# Patient Record
Sex: MALE | Race: WHITE | NOT HISPANIC OR LATINO | Employment: FULL TIME | URBAN - METROPOLITAN AREA
[De-identification: names, ages, dates, MRNs, and addresses within clinical notes are randomized per-mention and may not be internally consistent; named-entity substitution may affect disease eponyms.]

---

## 2017-02-16 ENCOUNTER — OFFICE VISIT (OUTPATIENT)
Dept: FAMILY MEDICINE CLINIC | Facility: CLINIC | Age: 47
End: 2017-02-16

## 2017-02-16 VITALS
WEIGHT: 283 LBS | HEIGHT: 72 IN | BODY MASS INDEX: 38.33 KG/M2 | SYSTOLIC BLOOD PRESSURE: 130 MMHG | OXYGEN SATURATION: 97 % | TEMPERATURE: 98 F | HEART RATE: 76 BPM | RESPIRATION RATE: 20 BRPM | DIASTOLIC BLOOD PRESSURE: 70 MMHG

## 2017-02-16 DIAGNOSIS — M25.569 CHRONIC KNEE PAIN, UNSPECIFIED LATERALITY: Primary | ICD-10-CM

## 2017-02-16 DIAGNOSIS — G62.9 NEUROPATHY: ICD-10-CM

## 2017-02-16 DIAGNOSIS — F41.9 ANXIETY: ICD-10-CM

## 2017-02-16 DIAGNOSIS — K21.9 GASTROESOPHAGEAL REFLUX DISEASE WITHOUT ESOPHAGITIS: ICD-10-CM

## 2017-02-16 DIAGNOSIS — G89.29 CHRONIC KNEE PAIN, UNSPECIFIED LATERALITY: Primary | ICD-10-CM

## 2017-02-16 PROCEDURE — 99213 OFFICE O/P EST LOW 20 MIN: CPT | Performed by: FAMILY MEDICINE

## 2017-02-16 RX ORDER — HYDROCODONE BITARTRATE AND ACETAMINOPHEN 7.5; 325 MG/1; MG/1
1 TABLET ORAL DAILY PRN
Qty: 30 TABLET | Refills: 0 | Status: SHIPPED | OUTPATIENT
Start: 2017-02-16 | End: 2017-03-14 | Stop reason: SDUPTHER

## 2017-02-16 NOTE — PATIENT INSTRUCTIONS
This is a very nice 46-year-old who struggles with chronic knee pain and neuropathy and is here for routine follow-up.  I will notify him when his urine drug test is back.  I would like him to call if there is a problem.

## 2017-02-16 NOTE — PROGRESS NOTES
Subjective   Al Peña is a 46 y.o. 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  He tells me he is very distraught because his  tried to commit suicide after it was found that he had in best old $500,000 from his employer over 3 years.  This was to feed his sex and gambling addiction's.  His  has survived and is home now and his 's mother and father have come up from [place] to help with his intensive treatment, but this has obviously caused tremendous stress.       The following portions of the patient's history were reviewed and updated as appropriate: current medications, past family history, past medical history, past social history, past surgical history and problem list.    Review of Systems   Musculoskeletal: Positive for arthralgias and back pain.   Psychiatric/Behavioral: Positive for sleep disturbance. The patient is nervous/anxious.    All other systems reviewed and are negative.      Objective   Physical Exam   Constitutional: He is oriented to person, place, and time. He appears well-developed and well-nourished.   HENT:   Head: Normocephalic and atraumatic.   Eyes: EOM are normal. Pupils are equal, round, and reactive to light.   Neck: Normal range of motion. Neck supple.   Cardiovascular: Normal rate and regular rhythm.    Pulmonary/Chest: Effort normal.   Musculoskeletal: Normal range of motion. He exhibits tenderness. He exhibits no edema.   Neurological: He is alert and oriented to person, place, and time.   Skin: Skin is warm and dry.   Psychiatric: His speech is normal and behavior is normal. Judgment and thought content normal. His mood appears anxious. His affect is not angry, not blunt, not  labile and not inappropriate. He is not actively hallucinating. Cognition and memory are normal. He exhibits a depressed mood. He is attentive.   Nursing note and vitals reviewed.      Assessment/Plan   Al was seen today for med refill and labs only.    Diagnoses and all orders for this visit:    Chronic knee pain, unspecified laterality  -     [id number]+Oxycodone+Crt-Unbund    Neuropathy    Gastroesophageal reflux disease without esophagitis    Anxiety    Other orders  -     HYDROcodone-acetaminophen (NORCO) 7.5-325 MG per tablet; Take 1 tablet by mouth Daily As Needed for moderate pain (4-6).                   I would like him to return for another visit in 6 month(s)

## 2017-02-21 LAB
ALPRAZ UR CFM-MCNC: 346 NG/ML
ALPRAZ UR QL: POSITIVE
AMPHETAMINES UR QL SCN: NEGATIVE NG/ML
BARBITURATES UR QL SCN: NEGATIVE NG/ML
BENZODIAZ UR QL SCN: NORMAL NG/ML
BENZODIAZ UR QL: POSITIVE NG/ML
BZE UR QL SCN: NEGATIVE NG/ML
CANNABINOIDS UR QL SCN: NEGATIVE NG/ML
CLONAZEPAM UR QL: NEGATIVE
CREAT UR-MCNC: 123.6 MG/DL (ref 20–300)
FLURAZEPAM UR QL: NEGATIVE
LORAZEPAM UR QL: NEGATIVE
Lab: NORMAL
METHADONE UR QL SCN: NEGATIVE NG/ML
MIDAZOLAM UR QL CFM: NEGATIVE
NORDIAZEPAM UR QL: NEGATIVE
OPIATES UR QL SCN: NEGATIVE NG/ML
OXAZEPAM UR QL: NEGATIVE
OXYCODONE+OXYMORPHONE UR QL SCN: NEGATIVE NG/ML
PCP UR QL: NEGATIVE NG/ML
PH UR: 6.2 [PH] (ref 4.5–8.9)
PROPOXYPH UR QL SCN: NEGATIVE NG/ML
TEMAZEPAM UR QL CFM: NEGATIVE
TRIAZOLAM UR QL: NEGATIVE

## 2017-02-24 ENCOUNTER — OFFICE VISIT (OUTPATIENT)
Dept: FAMILY MEDICINE CLINIC | Facility: CLINIC | Age: 47
End: 2017-02-24

## 2017-02-24 VITALS
OXYGEN SATURATION: 98 % | DIASTOLIC BLOOD PRESSURE: 78 MMHG | WEIGHT: 283 LBS | BODY MASS INDEX: 38.33 KG/M2 | HEART RATE: 78 BPM | TEMPERATURE: 98.6 F | SYSTOLIC BLOOD PRESSURE: 132 MMHG | HEIGHT: 72 IN | RESPIRATION RATE: 20 BRPM

## 2017-02-24 DIAGNOSIS — L98.9 SKIN LESION OF LEFT ARM: Primary | ICD-10-CM

## 2017-02-24 DIAGNOSIS — J01.01 ACUTE RECURRENT MAXILLARY SINUSITIS: ICD-10-CM

## 2017-02-24 PROCEDURE — 99213 OFFICE O/P EST LOW 20 MIN: CPT | Performed by: FAMILY MEDICINE

## 2017-02-24 RX ORDER — BENZONATATE 200 MG/1
200 CAPSULE ORAL 3 TIMES DAILY PRN
Qty: 20 CAPSULE | Refills: 2 | Status: SHIPPED | OUTPATIENT
Start: 2017-02-24 | End: 2017-05-15

## 2017-02-24 RX ORDER — AMOXICILLIN AND CLAVULANATE POTASSIUM 875; 125 MG/1; MG/1
1 TABLET, FILM COATED ORAL 2 TIMES DAILY
Qty: 14 TABLET | Refills: 0 | Status: SHIPPED | OUTPATIENT
Start: 2017-02-24 | End: 2017-05-15

## 2017-02-24 NOTE — PROGRESS NOTES
Subjective   Al Peña is a 46 y.o. male presenting with   Chief Complaint   Patient presents with   • URI     cough sore throat     • Fever     at night    • Brown Spots     lower and upper part of left leg  and left hand         HPI Comments: This is a 46-year-old who has skin lesions on his left arm and left leg that he picks at all the time and consequently they do not heal.  He is here to get a different dermatology consult because the last one made him white for hours to be seen.    He also has developed marked sinus pressure with drainage and low-grade fever at night with a cough.  Does not have headache or stiff neck or chest pain or shortness of breath or high fever    URI    Associated symptoms include congestion, coughing, a rash and rhinorrhea.   Fever    Associated symptoms include congestion, coughing and a rash.        The following portions of the patient's history were reviewed and updated as appropriate: current medications, past family history, past medical history, past social history, past surgical history and problem list.    Review of Systems   Constitutional: Positive for fever.   HENT: Positive for congestion, postnasal drip, rhinorrhea and sinus pressure.    Respiratory: Positive for cough.    Skin: Positive for rash.   All other systems reviewed and are negative.      Objective   Physical Exam   Constitutional: He is oriented to person, place, and time. He appears well-developed and well-nourished. No distress.   HENT:   Head: Normocephalic and atraumatic.   Nose: Mucosal edema and rhinorrhea present. No epistaxis. Right sinus exhibits maxillary sinus tenderness. Left sinus exhibits maxillary sinus tenderness.   Eyes: EOM are normal. Pupils are equal, round, and reactive to light.   Neck: Normal range of motion. Neck supple. No thyromegaly present.   Cardiovascular: Normal rate and regular rhythm.    Pulmonary/Chest: Effort normal and breath sounds normal. No respiratory distress.  He has no wheezes.   Musculoskeletal: Normal range of motion.   Lymphadenopathy:     He has no cervical adenopathy.   Neurological: He is oriented to person, place, and time.   Skin: Skin is warm and dry. Rash noted. He is not diaphoretic.   Psychiatric: He has a normal mood and affect. His behavior is normal.   Nursing note and vitals reviewed.      Assessment/Plan   Al was seen today for uri, fever and brown spots.    Diagnoses and all orders for this visit:    Skin lesion of left arm  -     Ambulatory Referral to Dermatology    Acute recurrent maxillary sinusitis    Other orders  -     amoxicillin-clavulanate (AUGMENTIN) 875-125 MG per tablet; Take 1 tablet by mouth 2 (Two) Times a Day.  -     benzonatate (TESSALON) 200 MG capsule; Take 1 capsule by mouth 3 (Three) Times a Day As Needed for cough.                   I would like him to return for another visit in 3 month(s)

## 2017-02-24 NOTE — PATIENT INSTRUCTIONS
This gentleman has acute sinusitis and also has a rash on his forearms and a lesion that will not heal on the left arm and leg.  I will request a dermatology consult.

## 2017-03-14 ENCOUNTER — TELEPHONE (OUTPATIENT)
Dept: FAMILY MEDICINE CLINIC | Facility: CLINIC | Age: 47
End: 2017-03-14

## 2017-03-14 RX ORDER — HYDROCODONE BITARTRATE AND ACETAMINOPHEN 7.5; 325 MG/1; MG/1
1 TABLET ORAL DAILY PRN
Qty: 30 TABLET | Refills: 0 | Status: SHIPPED | OUTPATIENT
Start: 2017-03-14 | End: 2017-04-12 | Stop reason: SDUPTHER

## 2017-04-12 RX ORDER — HYDROCODONE BITARTRATE AND ACETAMINOPHEN 7.5; 325 MG/1; MG/1
1 TABLET ORAL DAILY PRN
Qty: 30 TABLET | Refills: 0 | Status: SHIPPED | OUTPATIENT
Start: 2017-04-12 | End: 2017-05-15 | Stop reason: SDUPTHER

## 2017-05-15 ENCOUNTER — OFFICE VISIT (OUTPATIENT)
Dept: FAMILY MEDICINE CLINIC | Facility: CLINIC | Age: 47
End: 2017-05-15

## 2017-05-15 VITALS
HEART RATE: 90 BPM | HEIGHT: 72 IN | WEIGHT: 281 LBS | SYSTOLIC BLOOD PRESSURE: 102 MMHG | TEMPERATURE: 98.3 F | BODY MASS INDEX: 38.06 KG/M2 | DIASTOLIC BLOOD PRESSURE: 80 MMHG | OXYGEN SATURATION: 95 % | RESPIRATION RATE: 16 BRPM

## 2017-05-15 DIAGNOSIS — K21.9 GASTROESOPHAGEAL REFLUX DISEASE WITHOUT ESOPHAGITIS: ICD-10-CM

## 2017-05-15 DIAGNOSIS — J98.01 BRONCHOSPASM: Primary | ICD-10-CM

## 2017-05-15 DIAGNOSIS — G62.9 NEUROPATHY: ICD-10-CM

## 2017-05-15 DIAGNOSIS — F41.9 ANXIETY: ICD-10-CM

## 2017-05-15 PROCEDURE — 99213 OFFICE O/P EST LOW 20 MIN: CPT | Performed by: FAMILY MEDICINE

## 2017-05-15 RX ORDER — ALBUTEROL SULFATE 90 UG/1
2 AEROSOL, METERED RESPIRATORY (INHALATION) EVERY 4 HOURS PRN
Qty: 8 G | Refills: 4 | Status: SHIPPED | OUTPATIENT
Start: 2017-05-15 | End: 2019-01-25

## 2017-05-15 RX ORDER — HYDROCODONE BITARTRATE AND ACETAMINOPHEN 7.5; 325 MG/1; MG/1
1 TABLET ORAL DAILY PRN
Qty: 30 TABLET | Refills: 0 | Status: SHIPPED | OUTPATIENT
Start: 2017-05-15 | End: 2017-05-15 | Stop reason: SDUPTHER

## 2017-05-15 RX ORDER — AZELASTINE 1 MG/ML
2 SPRAY, METERED NASAL 2 TIMES DAILY
Qty: 30 ML | Refills: 2 | Status: SHIPPED | OUTPATIENT
Start: 2017-05-15 | End: 2022-05-10

## 2017-05-15 RX ORDER — ALPRAZOLAM 2 MG/1
2 TABLET ORAL 2 TIMES DAILY PRN
Qty: 60 TABLET | Refills: 2 | Status: SHIPPED | OUTPATIENT
Start: 2017-05-15 | End: 2017-08-09 | Stop reason: SDUPTHER

## 2017-05-15 RX ORDER — HYDROCODONE BITARTRATE AND ACETAMINOPHEN 7.5; 325 MG/1; MG/1
1 TABLET ORAL 2 TIMES DAILY PRN
Qty: 60 TABLET | Refills: 0 | Status: SHIPPED | OUTPATIENT
Start: 2017-05-15 | End: 2017-06-14 | Stop reason: SDUPTHER

## 2017-06-14 ENCOUNTER — TELEPHONE (OUTPATIENT)
Dept: FAMILY MEDICINE CLINIC | Facility: CLINIC | Age: 47
End: 2017-06-14

## 2017-06-14 RX ORDER — HYDROCODONE BITARTRATE AND ACETAMINOPHEN 7.5; 325 MG/1; MG/1
1 TABLET ORAL 2 TIMES DAILY PRN
Qty: 60 TABLET | Refills: 0 | Status: SHIPPED | OUTPATIENT
Start: 2017-06-14 | End: 2017-07-14 | Stop reason: SDUPTHER

## 2017-07-13 ENCOUNTER — TELEPHONE (OUTPATIENT)
Dept: FAMILY MEDICINE CLINIC | Facility: CLINIC | Age: 47
End: 2017-07-13

## 2017-07-14 RX ORDER — HYDROCODONE BITARTRATE AND ACETAMINOPHEN 7.5; 325 MG/1; MG/1
1 TABLET ORAL 2 TIMES DAILY PRN
Qty: 60 TABLET | Refills: 0 | Status: SHIPPED | OUTPATIENT
Start: 2017-07-14 | End: 2017-08-09 | Stop reason: SDUPTHER

## 2017-08-09 ENCOUNTER — OFFICE VISIT (OUTPATIENT)
Dept: FAMILY MEDICINE CLINIC | Facility: CLINIC | Age: 47
End: 2017-08-09

## 2017-08-09 VITALS
SYSTOLIC BLOOD PRESSURE: 130 MMHG | HEIGHT: 72 IN | DIASTOLIC BLOOD PRESSURE: 80 MMHG | HEART RATE: 76 BPM | WEIGHT: 293 LBS | BODY MASS INDEX: 39.68 KG/M2 | OXYGEN SATURATION: 96 %

## 2017-08-09 DIAGNOSIS — R94.5 ABNORMAL LIVER FUNCTION: ICD-10-CM

## 2017-08-09 DIAGNOSIS — E11.69 OBESITY, DIABETES, AND HYPERTENSION SYNDROME (HCC): ICD-10-CM

## 2017-08-09 DIAGNOSIS — E66.9 OBESITY, DIABETES, AND HYPERTENSION SYNDROME (HCC): ICD-10-CM

## 2017-08-09 DIAGNOSIS — B20 HUMAN IMMUNODEFICIENCY VIRUS (HIV) INFECTION (HCC): ICD-10-CM

## 2017-08-09 DIAGNOSIS — K21.9 GASTROESOPHAGEAL REFLUX DISEASE WITHOUT ESOPHAGITIS: ICD-10-CM

## 2017-08-09 DIAGNOSIS — R10.84 GENERALIZED ABDOMINAL PAIN: ICD-10-CM

## 2017-08-09 DIAGNOSIS — G62.9 NEUROPATHY: Primary | ICD-10-CM

## 2017-08-09 DIAGNOSIS — I15.2 OBESITY, DIABETES, AND HYPERTENSION SYNDROME (HCC): ICD-10-CM

## 2017-08-09 DIAGNOSIS — F41.9 ANXIETY: ICD-10-CM

## 2017-08-09 DIAGNOSIS — E11.59 OBESITY, DIABETES, AND HYPERTENSION SYNDROME (HCC): ICD-10-CM

## 2017-08-09 PROCEDURE — 99213 OFFICE O/P EST LOW 20 MIN: CPT | Performed by: FAMILY MEDICINE

## 2017-08-09 RX ORDER — ALPRAZOLAM 2 MG/1
2 TABLET ORAL 3 TIMES DAILY PRN
Qty: 90 TABLET | Refills: 2 | Status: SHIPPED | OUTPATIENT
Start: 2017-08-09 | End: 2017-11-06 | Stop reason: SDUPTHER

## 2017-08-09 RX ORDER — TESTOSTERONE CYPIONATE 200 MG/ML
INJECTION, SOLUTION INTRAMUSCULAR
COMMUNITY
Start: 2017-07-14 | End: 2019-01-16

## 2017-08-09 RX ORDER — ALPRAZOLAM 2 MG/1
2 TABLET ORAL 2 TIMES DAILY PRN
Qty: 60 TABLET | Refills: 2 | Status: SHIPPED | OUTPATIENT
Start: 2017-08-09 | End: 2017-08-09 | Stop reason: SDUPTHER

## 2017-08-09 RX ORDER — HYDROCODONE BITARTRATE AND ACETAMINOPHEN 7.5; 325 MG/1; MG/1
1 TABLET ORAL 2 TIMES DAILY PRN
Qty: 60 TABLET | Refills: 0 | Status: SHIPPED | OUTPATIENT
Start: 2017-08-09 | End: 2017-09-11 | Stop reason: SDUPTHER

## 2017-08-09 NOTE — PROGRESS NOTES
Subjective   Al Peña is a 47 y.o. male presenting with   Chief Complaint   Patient presents with   • Medciation Check Up   • Med Refill     written rx for hydrocodone  and alprazolam         HPI Comments: This is a 47-year-old  white male nonsmoker who comes in today with acute exacerbation of his anxiety.  His , who had lost his last job because of stealing of money, has now stolen money from the new job he was working at.  He was arrested and this patient is side himself with worry.  He says he is now working 3 jobs to try to make enough money to save their house.  He requests going to 3 times a day on his alprazolam for a short time.    He also says he has generalized abdominal discomfort with reflux and he seems to be convinced he has cancer.  He does not want to see the gastroenterologist he saw in the past because of a personality conflict.  He would like a referral to a new one.       The following portions of the patient's history were reviewed and updated as appropriate: current medications, past family history, past medical history, past social history, past surgical history and problem list.    Review of Systems   Psychiatric/Behavioral: The patient is nervous/anxious.    All other systems reviewed and are negative.      Objective   Physical Exam   Constitutional: He is oriented to person, place, and time. He appears well-developed and well-nourished. No distress.   HENT:   Head: Normocephalic and atraumatic.   Eyes: EOM are normal. Pupils are equal, round, and reactive to light.   Neck: Normal range of motion. Neck supple. No thyromegaly present.   Cardiovascular: Normal rate and regular rhythm.    Pulmonary/Chest: Effort normal and breath sounds normal.   Musculoskeletal: Normal range of motion. He exhibits no edema.   Neurological: He is alert and oriented to person, place, and time.   Skin: Skin is warm and dry. He is not diaphoretic.   Psychiatric: His speech is normal and  behavior is normal. Judgment and thought content normal. His mood appears anxious. His affect is not angry and not inappropriate. He is not actively hallucinating. Cognition and memory are normal. He exhibits a depressed mood. He is attentive.   Nursing note and vitals reviewed.      Assessment/Plan   Al was seen today for medciation check up and med refill.    Diagnoses and all orders for this visit:    Neuropathy    Human immunodeficiency virus (HIV) infection    Anxiety  -     Discontinue: ALPRAZolam (XANAX) 2 MG tablet; Take 1 tablet by mouth 2 (Two) Times a Day As Needed for Anxiety.  -     ALPRAZolam (XANAX) 2 MG tablet; Take 1 tablet by mouth 3 (Three) Times a Day As Needed for Anxiety.    Obesity, diabetes, and hypertension syndrome    Generalized abdominal pain  -     Ambulatory Referral to Gastroenterology    Abnormal liver function    Gastroesophageal reflux disease without esophagitis    Other orders  -     HYDROcodone-acetaminophen (NORCO) 7.5-325 MG per tablet; Take 1 tablet by mouth 2 (Two) Times a Day As Needed for Moderate Pain (4-6).                   I would like him to return for another visit in 3 month(s)

## 2017-09-01 ENCOUNTER — TELEPHONE (OUTPATIENT)
Dept: FAMILY MEDICINE CLINIC | Facility: CLINIC | Age: 47
End: 2017-09-01

## 2017-09-01 DIAGNOSIS — B37.0 ORAL THRUSH: Primary | ICD-10-CM

## 2017-09-01 RX ORDER — FLUCONAZOLE 150 MG/1
TABLET ORAL
Qty: 3 TABLET | Refills: 0 | Status: SHIPPED | OUTPATIENT
Start: 2017-09-01 | End: 2017-11-06

## 2017-09-01 NOTE — TELEPHONE ENCOUNTER
Pt called and said that he has thrush. He is having painful mouth and starting down his throat he was wondering if we can call in diflucan for this?

## 2017-09-11 ENCOUNTER — TELEPHONE (OUTPATIENT)
Dept: FAMILY MEDICINE CLINIC | Facility: CLINIC | Age: 47
End: 2017-09-11

## 2017-09-11 RX ORDER — HYDROCODONE BITARTRATE AND ACETAMINOPHEN 7.5; 325 MG/1; MG/1
1 TABLET ORAL 2 TIMES DAILY PRN
Qty: 60 TABLET | Refills: 0 | Status: SHIPPED | OUTPATIENT
Start: 2017-09-11 | End: 2017-10-09 | Stop reason: SDUPTHER

## 2017-09-11 NOTE — TELEPHONE ENCOUNTER
Pt called requesting a HYDROCODONE refill   LOV  8-9-17  LAST FILLED  8-9-17    MATY AND CONTRACT UP TO DATE

## 2017-10-09 ENCOUNTER — TELEPHONE (OUTPATIENT)
Dept: FAMILY MEDICINE CLINIC | Facility: CLINIC | Age: 47
End: 2017-10-09

## 2017-10-09 RX ORDER — HYDROCODONE BITARTRATE AND ACETAMINOPHEN 7.5; 325 MG/1; MG/1
1 TABLET ORAL 2 TIMES DAILY PRN
Qty: 60 TABLET | Refills: 0 | Status: SHIPPED | OUTPATIENT
Start: 2017-10-09 | End: 2017-11-06 | Stop reason: SDUPTHER

## 2017-11-06 ENCOUNTER — OFFICE VISIT (OUTPATIENT)
Dept: FAMILY MEDICINE CLINIC | Facility: CLINIC | Age: 47
End: 2017-11-06

## 2017-11-06 VITALS
DIASTOLIC BLOOD PRESSURE: 80 MMHG | TEMPERATURE: 98.2 F | HEIGHT: 72 IN | OXYGEN SATURATION: 94 % | SYSTOLIC BLOOD PRESSURE: 130 MMHG | HEART RATE: 82 BPM | BODY MASS INDEX: 39.68 KG/M2 | WEIGHT: 293 LBS

## 2017-11-06 DIAGNOSIS — F41.9 ANXIETY: ICD-10-CM

## 2017-11-06 DIAGNOSIS — M79.10 MUSCLE PAIN: Primary | ICD-10-CM

## 2017-11-06 DIAGNOSIS — G62.9 NEUROPATHY: ICD-10-CM

## 2017-11-06 PROCEDURE — 99213 OFFICE O/P EST LOW 20 MIN: CPT | Performed by: FAMILY MEDICINE

## 2017-11-06 RX ORDER — HYDROCODONE BITARTRATE AND ACETAMINOPHEN 7.5; 325 MG/1; MG/1
1 TABLET ORAL 2 TIMES DAILY PRN
Qty: 60 TABLET | Refills: 0 | Status: SHIPPED | OUTPATIENT
Start: 2017-11-06 | End: 2017-12-04 | Stop reason: SDUPTHER

## 2017-11-06 RX ORDER — PHENTERMINE HYDROCHLORIDE 37.5 MG/1
TABLET ORAL
COMMUNITY
Start: 2017-11-01 | End: 2018-06-08

## 2017-11-06 RX ORDER — CHOLECALCIFEROL (VITAMIN D3) 1250 MCG
CAPSULE ORAL
COMMUNITY
Start: 2017-11-01 | End: 2019-01-16

## 2017-11-06 RX ORDER — LIDOCAINE 50 MG/G
1 PATCH TOPICAL EVERY 24 HOURS
Qty: 30 PATCH | Refills: 5 | Status: SHIPPED | OUTPATIENT
Start: 2017-11-06 | End: 2018-08-19

## 2017-11-06 RX ORDER — ALPRAZOLAM 2 MG/1
2 TABLET ORAL 3 TIMES DAILY PRN
Qty: 90 TABLET | Refills: 2 | Status: SHIPPED | OUTPATIENT
Start: 2017-11-06 | End: 2018-02-02 | Stop reason: SDUPTHER

## 2017-11-06 RX ORDER — OSELTAMIVIR PHOSPHATE 75 MG/1
75 CAPSULE ORAL 2 TIMES DAILY
Qty: 10 CAPSULE | Refills: 0 | Status: SHIPPED | OUTPATIENT
Start: 2017-11-06 | End: 2018-02-14 | Stop reason: HOSPADM

## 2017-11-06 RX ORDER — LIDOCAINE 50 MG/G
PATCH TOPICAL
COMMUNITY
Start: 2017-09-21 | End: 2017-11-06 | Stop reason: SDUPTHER

## 2017-11-06 NOTE — PATIENT INSTRUCTIONS
This is a 47-year-old gentleman who is here for follow-up.  He needs to get back with his infectious disease doctor soon.

## 2017-11-06 NOTE — PROGRESS NOTES
"Subjective   Al Peña is a 47 y.o. male presenting with   Chief Complaint   Patient presents with   • Medication Check Up   • Med Refill     alprazolam and lidocaine patch  and written hydrocodone rx         HPI Comments: This is a 47-year-old who is here for follow-up for peripheral neuropathy with leg pain and back pain and also for anxiety.  It turns out that he became ill and missed 5 doses of his HIV medication and so he did not start back.  I told him that this is a very bad because it encourages emergence of resistance.  It also sets him up to develop opportunistic infections and Kaposi's sarcoma, etc.  He says he Moses has an appointment with his infectious disease doctor so I welcomed that.    Otherwise he is doing fairly well.  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       The following portions of the patient's history were reviewed and updated as appropriate: current medications, past family history, past medical history, past social history, past surgical history and problem list.    Review of Systems   Musculoskeletal: Positive for arthralgias and back pain.   Neurological: Positive for numbness.   All other systems reviewed and are negative.      Objective   Physical Exam   Constitutional: He is oriented to person, place, and time. He appears well-developed and well-nourished. No distress.   HENT:   Head: Normocephalic and atraumatic.   Eyes: EOM are normal. Pupils are equal, round, and reactive to light.   Neck: Normal range of motion. Neck supple.   Cardiovascular: Normal rate and regular rhythm.    Pulmonary/Chest: Effort normal and breath sounds normal.   Musculoskeletal: Normal range of motion. He exhibits no edema.   Neurological: He is alert and oriented to person, place, and time.   Skin: Skin is warm and dry. He is not diaphoretic.   Psychiatric: He has a " normal mood and affect. His behavior is normal.   Nursing note and vitals reviewed.      Assessment/Plan   Al was seen today for medication check up and med refill.    Diagnoses and all orders for this visit:    Muscle pain    Anxiety  -     ALPRAZolam (XANAX) 2 MG tablet; Take 1 tablet by mouth 3 (Three) Times a Day As Needed for Anxiety.    Neuropathy    Other orders  -     HYDROcodone-acetaminophen (NORCO) 7.5-325 MG per tablet; Take 1 tablet by mouth 2 (Two) Times a Day As Needed for Moderate Pain .  -     lidocaine (LIDODERM) 5 %; Place 1 patch on the skin Daily.                   I would like him to return for another visit in 3 month(s)

## 2017-11-15 ENCOUNTER — OFFICE VISIT (OUTPATIENT)
Dept: FAMILY MEDICINE CLINIC | Facility: CLINIC | Age: 47
End: 2017-11-15

## 2017-11-15 VITALS
DIASTOLIC BLOOD PRESSURE: 80 MMHG | HEART RATE: 74 BPM | SYSTOLIC BLOOD PRESSURE: 120 MMHG | HEIGHT: 72 IN | WEIGHT: 293 LBS | TEMPERATURE: 98.7 F | OXYGEN SATURATION: 96 % | BODY MASS INDEX: 39.68 KG/M2

## 2017-11-15 DIAGNOSIS — J40 BRONCHITIS: Primary | ICD-10-CM

## 2017-11-15 PROCEDURE — 99213 OFFICE O/P EST LOW 20 MIN: CPT | Performed by: FAMILY MEDICINE

## 2017-11-15 RX ORDER — AMOXICILLIN AND CLAVULANATE POTASSIUM 875; 125 MG/1; MG/1
1 TABLET, FILM COATED ORAL 2 TIMES DAILY
Qty: 14 TABLET | Refills: 0 | Status: SHIPPED | OUTPATIENT
Start: 2017-11-15 | End: 2017-12-04

## 2017-11-15 RX ORDER — FLUCONAZOLE 150 MG/1
150 TABLET ORAL DAILY
Qty: 7 TABLET | Refills: 0 | Status: SHIPPED | OUTPATIENT
Start: 2017-11-15 | End: 2018-03-12

## 2017-11-15 NOTE — PATIENT INSTRUCTIONS
This is a very nice 47-year-old who has developed fever 201 with chills and deep cough and chest congestion.  I will send out a prescription for Augmentin but would like him to call if he does not get better.

## 2017-11-15 NOTE — PROGRESS NOTES
Subjective   Al Peña is a 47 y.o. male presenting with   Chief Complaint   Patient presents with   • URI     cough  drainage  fever 101.2  chills aches x 3 days         HPI Comments: 47-year-old white male nonsmoker developed influenza and took his Tamiflu and was getting better but then for the last 3 days has had chest congestion and cough and 101 fever with chills.  He does not have any chest pain or dizziness or hemoptysis or leg swelling.  He is not short of breath.    URI    Associated symptoms include coughing.        The following portions of the patient's history were reviewed and updated as appropriate: current medications, past family history, past medical history, past social history, past surgical history and problem list.    Review of Systems   Constitutional: Positive for fatigue and fever.   Respiratory: Positive for cough.    All other systems reviewed and are negative.      Objective   Physical Exam   Constitutional: He is oriented to person, place, and time. He appears well-developed and well-nourished. No distress.   HENT:   Head: Normocephalic and atraumatic.   Nose: Mucosal edema and rhinorrhea present.   Mouth/Throat: Posterior oropharyngeal erythema present. No oropharyngeal exudate or posterior oropharyngeal edema.   Eyes: EOM are normal. Pupils are equal, round, and reactive to light.   Neck: Normal range of motion. Neck supple. No thyromegaly present.   Cardiovascular: Normal rate and regular rhythm.    Pulmonary/Chest: Effort normal. No respiratory distress. He has no wheezes. He has rhonchi in the right upper field, the right middle field and the left upper field. He has no rales.   Musculoskeletal: He exhibits no edema or tenderness.   Lymphadenopathy:     He has no cervical adenopathy.   Neurological: He is alert and oriented to person, place, and time.   Skin: Skin is warm and dry. He is not diaphoretic.   Psychiatric: He has a normal mood and affect. His behavior is normal.    Nursing note and vitals reviewed.      Assessment/Plan   Al was seen today for uri.    Diagnoses and all orders for this visit:    Bronchitis    Other orders  -     amoxicillin-clavulanate (AUGMENTIN) 875-125 MG per tablet; Take 1 tablet by mouth 2 (Two) Times a Day.  -     fluconazole (DIFLUCAN) 150 MG tablet; Take 1 tablet by mouth Daily.                   I would like him to return for another visit in 3 month(s)

## 2017-12-04 ENCOUNTER — TELEPHONE (OUTPATIENT)
Dept: FAMILY MEDICINE CLINIC | Facility: CLINIC | Age: 47
End: 2017-12-04

## 2017-12-04 RX ORDER — HYDROCODONE BITARTRATE AND ACETAMINOPHEN 7.5; 325 MG/1; MG/1
1 TABLET ORAL 2 TIMES DAILY PRN
Qty: 60 TABLET | Refills: 0 | Status: SHIPPED | OUTPATIENT
Start: 2017-12-04 | End: 2018-01-03 | Stop reason: SDUPTHER

## 2017-12-04 RX ORDER — ACYCLOVIR 800 MG/1
800 TABLET ORAL 3 TIMES DAILY
Qty: 30 TABLET | Refills: 5 | Status: SHIPPED | OUTPATIENT
Start: 2017-12-04 | End: 2018-06-08

## 2017-12-04 NOTE — TELEPHONE ENCOUNTER
Pt calling for a HYDROCODONE refill    And states he feels the shingles is coming back on his neck again and asked if he can gets a rx for ACYCLOVIR sent to [facility]

## 2018-01-03 ENCOUNTER — TELEPHONE (OUTPATIENT)
Dept: FAMILY MEDICINE CLINIC | Facility: CLINIC | Age: 48
End: 2018-01-03

## 2018-01-03 RX ORDER — HYDROCODONE BITARTRATE AND ACETAMINOPHEN 7.5; 325 MG/1; MG/1
1 TABLET ORAL 2 TIMES DAILY PRN
Qty: 60 TABLET | Refills: 0 | Status: SHIPPED | OUTPATIENT
Start: 2018-01-03 | End: 2018-02-02 | Stop reason: SDUPTHER

## 2018-01-03 NOTE — TELEPHONE ENCOUNTER
Printing, but we will need to start working on a plan to reduce the use of this, since everyone is so down on pain meds for chronic problems.

## 2018-01-17 ENCOUNTER — OFFICE VISIT (OUTPATIENT)
Dept: FAMILY MEDICINE CLINIC | Facility: CLINIC | Age: 48
End: 2018-01-17

## 2018-01-17 VITALS
HEART RATE: 83 BPM | BODY MASS INDEX: 40.23 KG/M2 | WEIGHT: 297 LBS | HEIGHT: 72 IN | OXYGEN SATURATION: 98 % | SYSTOLIC BLOOD PRESSURE: 120 MMHG | DIASTOLIC BLOOD PRESSURE: 60 MMHG | TEMPERATURE: 98 F

## 2018-01-17 DIAGNOSIS — K52.9 GASTROENTERITIS, ACUTE: Primary | ICD-10-CM

## 2018-01-17 DIAGNOSIS — B20 HUMAN IMMUNODEFICIENCY VIRUS (HIV) INFECTION (HCC): ICD-10-CM

## 2018-01-17 PROBLEM — J40 BRONCHITIS: Status: RESOLVED | Noted: 2017-11-15 | Resolved: 2018-01-17

## 2018-01-17 PROBLEM — J01.01 ACUTE RECURRENT MAXILLARY SINUSITIS: Status: RESOLVED | Noted: 2017-02-24 | Resolved: 2018-01-17

## 2018-01-17 PROCEDURE — 99213 OFFICE O/P EST LOW 20 MIN: CPT | Performed by: FAMILY MEDICINE

## 2018-01-17 RX ORDER — ONDANSETRON 4 MG/1
4 TABLET, FILM COATED ORAL EVERY 8 HOURS PRN
Qty: 12 TABLET | Refills: 2 | Status: SHIPPED | OUTPATIENT
Start: 2018-01-17 | End: 2018-03-12

## 2018-01-17 NOTE — PROGRESS NOTES
Subjective   Al Peña is a 47 y.o. male presenting with   Chief Complaint   Patient presents with   • Vomiting   • Diarrhea        HPI Comments: 47-year-old nonsmoker comes in with a 2 day history of nausea and vomiting and diarrhea.  He says he was exposed to a person who said that their daughter just had to be admitted for an Noro virus and he believes this is where he got it.  He does not have any dizziness or loss of consciousness or high fever or chills.  He does not have any blood in his vomit or blood in his stool.    Vomiting    Associated symptoms include diarrhea.   Diarrhea    Associated symptoms include vomiting.        The following portions of the patient's history were reviewed and updated as appropriate: current medications, past family history, past medical history, past social history, past surgical history and problem list.    Review of Systems   Gastrointestinal: Positive for diarrhea and vomiting.   All other systems reviewed and are negative.      Objective   Physical Exam   Constitutional: He is oriented to person, place, and time. He appears well-developed and well-nourished. No distress.   HENT:   Head: Normocephalic and atraumatic.   Mouth/Throat: Oropharynx is clear and moist. No oropharyngeal exudate.   Eyes: EOM are normal. Pupils are equal, round, and reactive to light.   Neck: Normal range of motion. Neck supple. No thyromegaly present.   Cardiovascular: Normal rate and regular rhythm.    Pulmonary/Chest: Effort normal and breath sounds normal.   Abdominal: Soft. Bowel sounds are normal. He exhibits no distension and no mass. There is tenderness (mild diffuse tenderness without any rigidity). There is no rebound and no guarding.   Musculoskeletal: Normal range of motion. He exhibits no edema or tenderness.   Lymphadenopathy:     He has no cervical adenopathy.   Neurological: He is alert and oriented to person, place, and time.   Skin: Skin is warm and dry. He is not  diaphoretic.   Psychiatric: He has a normal mood and affect. His behavior is normal.   Nursing note and vitals reviewed.      Assessment/Plan   Al was seen today for vomiting and diarrhea.    Diagnoses and all orders for this visit:    Gastroenteritis, acute    Human immunodeficiency virus (HIV) infection    Other orders  -     ondansetron (ZOFRAN) 4 MG tablet; Take 1 tablet by mouth Every 8 (Eight) Hours As Needed for Nausea or Vomiting.                   I would like him to return for another visit in 6 month(s)

## 2018-01-17 NOTE — PATIENT INSTRUCTIONS
This is a very nice 47-year-old with acute viral gastroenteritis.  I will send out a prescription for Zofran tablets but would like him to call if there is a problem.

## 2018-02-02 ENCOUNTER — OFFICE VISIT (OUTPATIENT)
Dept: FAMILY MEDICINE CLINIC | Facility: CLINIC | Age: 48
End: 2018-02-02

## 2018-02-02 VITALS
OXYGEN SATURATION: 98 % | HEIGHT: 72 IN | WEIGHT: 291 LBS | HEART RATE: 85 BPM | SYSTOLIC BLOOD PRESSURE: 138 MMHG | BODY MASS INDEX: 39.42 KG/M2 | DIASTOLIC BLOOD PRESSURE: 82 MMHG | TEMPERATURE: 98 F | RESPIRATION RATE: 16 BRPM

## 2018-02-02 DIAGNOSIS — G62.9 NEUROPATHY: Primary | ICD-10-CM

## 2018-02-02 DIAGNOSIS — F41.9 ANXIETY: ICD-10-CM

## 2018-02-02 PROCEDURE — 99213 OFFICE O/P EST LOW 20 MIN: CPT | Performed by: FAMILY MEDICINE

## 2018-02-02 RX ORDER — HYDROCODONE BITARTRATE AND ACETAMINOPHEN 7.5; 325 MG/1; MG/1
1 TABLET ORAL 2 TIMES DAILY PRN
Qty: 60 TABLET | Refills: 0 | Status: SHIPPED | OUTPATIENT
Start: 2018-02-02 | End: 2018-03-02 | Stop reason: SDUPTHER

## 2018-02-02 RX ORDER — ALPRAZOLAM 2 MG/1
2 TABLET ORAL 3 TIMES DAILY PRN
Qty: 90 TABLET | Refills: 2 | Status: SHIPPED | OUTPATIENT
Start: 2018-02-02 | End: 2018-05-01 | Stop reason: SDUPTHER

## 2018-02-02 NOTE — PATIENT INSTRUCTIONS
This is an extremely nice 47-year-old who is here for routine follow-up.  He appears to be doing quite well and is not suffering from any side effects so I have renewed his prescription.  I would like him to call if there is any problem.

## 2018-02-02 NOTE — PROGRESS NOTES
Subjective   Al Peña is a 47 y.o. male presenting with   Chief Complaint   Patient presents with   • Anxiety     med check    • Peripheral Neuropathy        HPI Comments: 47-year-old white male nonsmoker and nondrinker is here for follow-up for his peripheral neuropathy caused by HIV and his medication for HIV and for his chronic anxiety neurosis.  He tells me he is doing quite well and he is quite productive.  He works as a manager of a [employer].  He does have some left bicep pain from all the lifting he has to do but understands that that is just tendinitis and not any kind of red flag symptom of anything else.    I did warn him about the possibility of side effects such as excessive sedation from the combination of opioids and alprazolam.  He says he does not take them in conjunction with each other.  Fortunately he also does not drink alcohol.    Anxiety   Symptoms include nervous/anxious behavior.       Peripheral Neuropathy          The following portions of the patient's history were reviewed and updated as appropriate: current medications, past family history, past medical history, past social history, past surgical history and problem list.    Review of Systems   Musculoskeletal: Positive for back pain.   Psychiatric/Behavioral: The patient is nervous/anxious.    All other systems reviewed and are negative.      Objective   Physical Exam   Constitutional: He is oriented to person, place, and time. He appears well-developed and well-nourished. No distress.   HENT:   Head: Normocephalic and atraumatic.   Eyes: EOM are normal. Pupils are equal, round, and reactive to light.   Neck: Normal range of motion. Neck supple.   Cardiovascular: Normal rate and regular rhythm.    Pulmonary/Chest: Effort normal and breath sounds normal.   Musculoskeletal: Normal range of motion. He exhibits tenderness (some tenderness over the anterior shoulder and proximal biceps to elbow flexion against  resistance). He exhibits no edema or deformity.   Neurological: He is alert and oriented to person, place, and time.   Skin: Skin is warm and dry. He is not diaphoretic.   Psychiatric: He has a normal mood and affect. His behavior is normal.   Nursing note and vitals reviewed.      Assessment/Plan   Al was seen today for anxiety and peripheral neuropathy.    Diagnoses and all orders for this visit:    Neuropathy    Anxiety  -     ALPRAZolam (XANAX) 2 MG tablet; Take 1 tablet by mouth 3 (Three) Times a Day As Needed for Anxiety.    Other orders  -     HYDROcodone-acetaminophen (NORCO) 7.5-325 MG per tablet; Take 1 tablet by mouth 2 (Two) Times a Day As Needed for Moderate Pain .                   I would like him to return for another visit in 3 month(s)

## 2018-02-14 ENCOUNTER — OFFICE VISIT (OUTPATIENT)
Dept: FAMILY MEDICINE CLINIC | Facility: CLINIC | Age: 48
End: 2018-02-14

## 2018-02-14 VITALS
HEIGHT: 72 IN | RESPIRATION RATE: 16 BRPM | TEMPERATURE: 98.3 F | HEART RATE: 87 BPM | OXYGEN SATURATION: 96 % | WEIGHT: 290 LBS | DIASTOLIC BLOOD PRESSURE: 80 MMHG | BODY MASS INDEX: 39.28 KG/M2 | SYSTOLIC BLOOD PRESSURE: 122 MMHG

## 2018-02-14 DIAGNOSIS — K21.9 GASTROESOPHAGEAL REFLUX DISEASE WITHOUT ESOPHAGITIS: ICD-10-CM

## 2018-02-14 DIAGNOSIS — M79.10 MUSCLE PAIN: ICD-10-CM

## 2018-02-14 DIAGNOSIS — E66.9 OBESITY, DIABETES, AND HYPERTENSION SYNDROME (HCC): ICD-10-CM

## 2018-02-14 DIAGNOSIS — E11.59 OBESITY, DIABETES, AND HYPERTENSION SYNDROME (HCC): ICD-10-CM

## 2018-02-14 DIAGNOSIS — J02.0 ACUTE STREPTOCOCCAL PHARYNGITIS: ICD-10-CM

## 2018-02-14 DIAGNOSIS — I15.2 OBESITY, DIABETES, AND HYPERTENSION SYNDROME (HCC): ICD-10-CM

## 2018-02-14 DIAGNOSIS — J02.9 SORE THROAT: Primary | ICD-10-CM

## 2018-02-14 DIAGNOSIS — R94.5 ABNORMAL LIVER FUNCTION: ICD-10-CM

## 2018-02-14 DIAGNOSIS — E11.69 OBESITY, DIABETES, AND HYPERTENSION SYNDROME (HCC): ICD-10-CM

## 2018-02-14 DIAGNOSIS — E34.9 TESTOSTERONE DEFICIENCY: ICD-10-CM

## 2018-02-14 LAB
EXPIRATION DATE: ABNORMAL
INTERNAL CONTROL: ABNORMAL
Lab: ABNORMAL
S PYO AG THROAT QL: POSITIVE

## 2018-02-14 PROCEDURE — 99213 OFFICE O/P EST LOW 20 MIN: CPT | Performed by: FAMILY MEDICINE

## 2018-02-14 PROCEDURE — 87880 STREP A ASSAY W/OPTIC: CPT | Performed by: FAMILY MEDICINE

## 2018-02-14 RX ORDER — AMOXICILLIN 500 MG/1
500 TABLET, FILM COATED ORAL EVERY 8 HOURS SCHEDULED
Qty: 30 TABLET | Refills: 0 | Status: SHIPPED | OUTPATIENT
Start: 2018-02-14 | End: 2018-03-12

## 2018-02-14 NOTE — PATIENT INSTRUCTIONS
This gentleman has acute streptococcal pharyngitis but also has muscle soreness and fatigue and a history of hyperglycemia and adrenal adenoma.  I will request records from his endocrinologist and blood work and notify him when the results are available.

## 2018-02-14 NOTE — PROGRESS NOTES
"Subjective   Al Peña is a 47 y.o. male presenting with   Chief Complaint   Patient presents with   • Possible Strep   • Cough   • Sinusitis   • Back Pain   • Labs Only     wants cbc drawn, [facility], [place]        HPI Comments: 47-year-old white male nonsmoker comes in today with fatigue and sore throat.  He test positive for strep.  Today.    He also says that for a while he has been very fatigued and he has muscle soreness.  He also says he goes to an endocrinologist named Dr. King, and he says that it is very hard to get in to see them annual wait for up to 8 hours in the waiting room.  He asks if I can take over prescribing his testosterone.  On further evaluation he says that he was told he had \"[place]'s disease\".  However he was also told he had low growth hormone and his FSH and LH were extremely low.  This actually sounds more like panhypopituitarism, so I will request old records to see what really is going on.    Cough   Associated symptoms include a fever, myalgias and a sore throat.   Sinusitis   Associated symptoms include coughing and a sore throat.   Back Pain   Associated symptoms include a fever.        The following portions of the patient's history were reviewed and updated as appropriate: current medications, past family history, past medical history, past social history, past surgical history and problem list.    Review of Systems   Constitutional: Positive for fatigue and fever.   HENT: Positive for sore throat.    Respiratory: Positive for cough.    Musculoskeletal: Positive for back pain and myalgias.   All other systems reviewed and are negative.      Objective   Physical Exam   Constitutional: He is oriented to person, place, and time. He appears well-developed and well-nourished. No distress.   HENT:   Head: Normocephalic and atraumatic.   Right Ear: External ear normal.   Left Ear: External ear normal.   Mouth/Throat: Uvula is midline and mucous membranes are " normal. Oropharyngeal exudate and posterior oropharyngeal erythema present. No posterior oropharyngeal edema or tonsillar abscesses.   Eyes: EOM are normal. Pupils are equal, round, and reactive to light.   Neck: Normal range of motion. Neck supple. No thyromegaly present.   Cardiovascular: Normal rate and regular rhythm.    Pulmonary/Chest: Effort normal and breath sounds normal.   Musculoskeletal: Normal range of motion.   Lymphadenopathy:     He has no cervical adenopathy.   Neurological: He is alert and oriented to person, place, and time.   Skin: Skin is warm and dry. He is not diaphoretic.   Psychiatric: He has a normal mood and affect. His behavior is normal.   Nursing note and vitals reviewed.      Assessment/Plan   Al was seen today for possible strep, cough, sinusitis, back pain and labs only.    Diagnoses and all orders for this visit:    Sore throat  -     POCT rapid strep A    Acute streptococcal pharyngitis  -     TSH    Muscle pain  -     CBC & Differential  -     Hemoglobin A1c  -     Testosterone  -     Comprehensive Metabolic Panel  -     TSH    Testosterone deficiency  -     Testosterone    Abnormal liver function  -     CBC & Differential  -     Comprehensive Metabolic Panel    Gastroesophageal reflux disease without esophagitis    Obesity, diabetes, and hypertension syndrome  -     Hemoglobin A1c    Other orders  -     amoxicillin (AMOXIL) 500 MG tablet; Take 1 tablet by mouth Every 8 (Eight) Hours.                   I would like him to return for another visit in 6 month(s)

## 2018-02-15 LAB
ALBUMIN SERPL-MCNC: 4.3 G/DL (ref 3.5–5.2)
ALBUMIN/GLOB SERPL: 1.2 G/DL
ALP SERPL-CCNC: 64 U/L (ref 39–117)
ALT SERPL-CCNC: 105 U/L (ref 1–41)
AST SERPL-CCNC: 69 U/L (ref 1–40)
BASOPHILS # BLD AUTO: 0.01 10*3/MM3 (ref 0–0.2)
BASOPHILS NFR BLD AUTO: 0.3 % (ref 0–1.5)
BILIRUB SERPL-MCNC: 0.3 MG/DL (ref 0.1–1.2)
BUN SERPL-MCNC: 21 MG/DL (ref 6–20)
BUN/CREAT SERPL: 21.9 (ref 7–25)
CALCIUM SERPL-MCNC: 9.5 MG/DL (ref 8.6–10.5)
CHLORIDE SERPL-SCNC: 103 MMOL/L (ref 98–107)
CO2 SERPL-SCNC: 23.2 MMOL/L (ref 22–29)
CREAT SERPL-MCNC: 0.96 MG/DL (ref 0.76–1.27)
EOSINOPHIL # BLD AUTO: 0.23 10*3/MM3 (ref 0–0.7)
EOSINOPHIL NFR BLD AUTO: 6.8 % (ref 0.3–6.2)
ERYTHROCYTE [DISTWIDTH] IN BLOOD BY AUTOMATED COUNT: 13.4 % (ref 11.5–14.5)
GFR SERPLBLD CREATININE-BSD FMLA CKD-EPI: 102 ML/MIN/1.73
GFR SERPLBLD CREATININE-BSD FMLA CKD-EPI: 84 ML/MIN/1.73
GLOBULIN SER CALC-MCNC: 3.5 GM/DL
GLUCOSE SERPL-MCNC: 90 MG/DL (ref 65–99)
HBA1C MFR BLD: 4.9 % (ref 4.8–5.6)
HCT VFR BLD AUTO: 45 % (ref 40.4–52.2)
HGB BLD-MCNC: 15.1 G/DL (ref 13.7–17.6)
IMM GRANULOCYTES # BLD: 0 10*3/MM3 (ref 0–0.03)
IMM GRANULOCYTES NFR BLD: 0 % (ref 0–0.5)
LYMPHOCYTES # BLD AUTO: 1.56 10*3/MM3 (ref 0.9–4.8)
LYMPHOCYTES NFR BLD AUTO: 46.3 % (ref 19.6–45.3)
MCH RBC QN AUTO: 29.3 PG (ref 27–32.7)
MCHC RBC AUTO-ENTMCNC: 33.6 G/DL (ref 32.6–36.4)
MCV RBC AUTO: 87.2 FL (ref 79.8–96.2)
MONOCYTES # BLD AUTO: 0.33 10*3/MM3 (ref 0.2–1.2)
MONOCYTES NFR BLD AUTO: 9.8 % (ref 5–12)
NEUTROPHILS # BLD AUTO: 1.24 10*3/MM3 (ref 1.9–8.1)
NEUTROPHILS NFR BLD AUTO: 36.8 % (ref 42.7–76)
PLATELET # BLD AUTO: 163 10*3/MM3 (ref 140–500)
POTASSIUM SERPL-SCNC: 4.7 MMOL/L (ref 3.5–5.2)
PROT SERPL-MCNC: 7.8 G/DL (ref 6–8.5)
RBC # BLD AUTO: 5.16 10*6/MM3 (ref 4.6–6)
SODIUM SERPL-SCNC: 139 MMOL/L (ref 136–145)
TESTOST SERPL-MCNC: 275 NG/DL (ref 264–916)
TSH SERPL DL<=0.005 MIU/L-ACNC: 4.57 MIU/ML (ref 0.27–4.2)
WBC # BLD AUTO: 3.37 10*3/MM3 (ref 4.5–10.7)

## 2018-02-28 RX ORDER — HYDROCODONE BITARTRATE AND ACETAMINOPHEN 7.5; 325 MG/1; MG/1
1 TABLET ORAL 2 TIMES DAILY PRN
Qty: 60 TABLET | Refills: 0 | Status: CANCELLED | OUTPATIENT
Start: 2018-02-28

## 2018-03-02 ENCOUNTER — TELEPHONE (OUTPATIENT)
Dept: FAMILY MEDICINE CLINIC | Facility: CLINIC | Age: 48
End: 2018-03-02

## 2018-03-02 RX ORDER — HYDROCODONE BITARTRATE AND ACETAMINOPHEN 7.5; 325 MG/1; MG/1
1 TABLET ORAL 2 TIMES DAILY PRN
Qty: 60 TABLET | Refills: 0 | Status: SHIPPED | OUTPATIENT
Start: 2018-03-02 | End: 2018-04-06 | Stop reason: SDUPTHER

## 2018-04-06 ENCOUNTER — OFFICE VISIT (OUTPATIENT)
Dept: FAMILY MEDICINE CLINIC | Facility: CLINIC | Age: 48
End: 2018-04-06

## 2018-04-06 VITALS
TEMPERATURE: 98 F | WEIGHT: 288.8 LBS | HEART RATE: 82 BPM | RESPIRATION RATE: 16 BRPM | BODY MASS INDEX: 39.12 KG/M2 | OXYGEN SATURATION: 98 % | HEIGHT: 72 IN | SYSTOLIC BLOOD PRESSURE: 138 MMHG | DIASTOLIC BLOOD PRESSURE: 84 MMHG

## 2018-04-06 DIAGNOSIS — E11.59 OBESITY, DIABETES, AND HYPERTENSION SYNDROME (HCC): ICD-10-CM

## 2018-04-06 DIAGNOSIS — G62.9 NEUROPATHY: Primary | ICD-10-CM

## 2018-04-06 DIAGNOSIS — J30.9 CHRONIC ALLERGIC RHINITIS, UNSPECIFIED SEASONALITY, UNSPECIFIED TRIGGER: ICD-10-CM

## 2018-04-06 DIAGNOSIS — M79.10 MUSCLE PAIN: ICD-10-CM

## 2018-04-06 DIAGNOSIS — I15.2 OBESITY, DIABETES, AND HYPERTENSION SYNDROME (HCC): ICD-10-CM

## 2018-04-06 DIAGNOSIS — E66.9 OBESITY, DIABETES, AND HYPERTENSION SYNDROME (HCC): ICD-10-CM

## 2018-04-06 DIAGNOSIS — E11.69 OBESITY, DIABETES, AND HYPERTENSION SYNDROME (HCC): ICD-10-CM

## 2018-04-06 PROCEDURE — 99213 OFFICE O/P EST LOW 20 MIN: CPT | Performed by: FAMILY MEDICINE

## 2018-04-06 RX ORDER — HYDROCODONE BITARTRATE AND ACETAMINOPHEN 7.5; 325 MG/1; MG/1
1 TABLET ORAL 2 TIMES DAILY PRN
Qty: 60 TABLET | Refills: 0 | Status: SHIPPED | OUTPATIENT
Start: 2018-04-06 | End: 2018-05-01 | Stop reason: SDUPTHER

## 2018-04-06 NOTE — PROGRESS NOTES
Subjective   Al Peña is a 47 y.o. male presenting with   Chief Complaint   Patient presents with   • Peripheral Neuropathy     med refill         47-year-old  white male nonsmoker here for routine follow-up for peripheral neuropathy.  He does very well on his current medication without asking for early refills and without side effects.    He did just have a maxillary premolar removed and developed epistaxis during the procedure.  He was told that the root extended into the maxillary sinus and the oral surgeon placed a suture in the cavity to try and close it.  This patient is very concerned that he might develop a fistula.  We talked about the things he could do to try and prevent that.  Specifically I really urged him to not blow his nose firmly or sneeze with his mouth closed.      Peripheral Neuropathy   Associated symptoms include congestion.        The following portions of the patient's history were reviewed and updated as appropriate: current medications, past family history, past medical history, past social history, past surgical history and problem list.    Review of Systems   HENT: Positive for congestion.    All other systems reviewed and are negative.      Objective   Physical Exam   Constitutional: He is oriented to person, place, and time. He appears well-developed and well-nourished.   HENT:   Head: Normocephalic and atraumatic.   Mouth/Throat:       Eyes: EOM are normal. Pupils are equal, round, and reactive to light.   Neck: Normal range of motion. Neck supple.   Musculoskeletal: Normal range of motion. He exhibits no edema or tenderness.   Neurological: He is alert and oriented to person, place, and time.   Skin: Skin is warm and dry.   Psychiatric: He has a normal mood and affect. His behavior is normal.   Nursing note and vitals reviewed.      Assessment/Plan   Al was seen today for peripheral neuropathy.    Diagnoses and all orders for this visit:    Neuropathy    Muscle  pain    Obesity, diabetes, and hypertension syndrome    Chronic allergic rhinitis, unspecified seasonality, unspecified trigger    Other orders  -     HYDROcodone-acetaminophen (NORCO) 7.5-325 MG per tablet; Take 1 tablet by mouth 2 (Two) Times a Day As Needed for Moderate Pain .                   I would like him to return for another visit in 3 month(s)

## 2018-04-06 NOTE — PATIENT INSTRUCTIONS
This is a very nice 47-year-old who is here for routine follow-up for peripheral neuropathy.  He is stable on his current medications so I've given him refills but I would like him to call if there is a problem.

## 2018-05-01 ENCOUNTER — OFFICE VISIT (OUTPATIENT)
Dept: FAMILY MEDICINE CLINIC | Facility: CLINIC | Age: 48
End: 2018-05-01

## 2018-05-01 VITALS
SYSTOLIC BLOOD PRESSURE: 110 MMHG | WEIGHT: 286.8 LBS | BODY MASS INDEX: 38.85 KG/M2 | HEIGHT: 72 IN | OXYGEN SATURATION: 98 % | HEART RATE: 98 BPM | TEMPERATURE: 97.5 F | DIASTOLIC BLOOD PRESSURE: 60 MMHG

## 2018-05-01 DIAGNOSIS — F41.9 ANXIETY: ICD-10-CM

## 2018-05-01 DIAGNOSIS — G62.9 NEUROPATHY: Primary | ICD-10-CM

## 2018-05-01 DIAGNOSIS — M79.10 MUSCLE PAIN: ICD-10-CM

## 2018-05-01 PROCEDURE — 99213 OFFICE O/P EST LOW 20 MIN: CPT | Performed by: FAMILY MEDICINE

## 2018-05-01 RX ORDER — ALPRAZOLAM 2 MG/1
2 TABLET ORAL 3 TIMES DAILY PRN
Qty: 90 TABLET | Refills: 0 | Status: SHIPPED | OUTPATIENT
Start: 2018-05-01 | End: 2018-06-08 | Stop reason: SDUPTHER

## 2018-05-01 RX ORDER — HYDROCODONE BITARTRATE AND ACETAMINOPHEN 7.5; 325 MG/1; MG/1
1 TABLET ORAL DAILY PRN
Qty: 30 TABLET | Refills: 0 | Status: SHIPPED | OUTPATIENT
Start: 2018-05-01 | End: 2018-06-08 | Stop reason: SDUPTHER

## 2018-05-01 NOTE — PROGRESS NOTES
Subjective   Al Peña is a 48 y.o. male presenting with   Chief Complaint   Patient presents with   • Med Refill        This is a 48-year-old gentleman who is here for follow-up for chronic pain due to neuropathy from his HIV and also for generalized anxiety disorder.  I had a carmen discussion with him about the use of both benzodiazepines and narcotics and that recent studies suggest that there is an increased risk of overdose.  Together we decided we would first work on lowering his frequency of taking the narcotic and that he would only take them on days he was really hurting.  He said at this time he was not ready to reduce the dose or frequency of his Xanax because his work is extremely stressful and he is working about 70 hours a week.  He also is very concerned about his  who has a gambling addiction and lost his job.    He is very pleased that he has managed to lose 46 pounds through diet and exercise.         The following portions of the patient's history were reviewed and updated as appropriate: current medications, past family history, past medical history, past social history, past surgical history and problem list.    Review of Systems   Musculoskeletal: Positive for arthralgias.   Psychiatric/Behavioral: The patient is nervous/anxious.    All other systems reviewed and are negative.      Objective   Physical Exam   Constitutional: He is oriented to person, place, and time. He appears well-developed and well-nourished. No distress.   HENT:   Head: Normocephalic and atraumatic.   Eyes: EOM are normal. Pupils are equal, round, and reactive to light.   Neck: Normal range of motion. Neck supple.   Cardiovascular: Normal rate and regular rhythm.    Pulmonary/Chest: Effort normal and breath sounds normal.   Musculoskeletal: Normal range of motion. He exhibits no deformity.   Neurological: He is alert and oriented to person, place, and time.   Skin: Skin is warm and dry. He is not diaphoretic.    Psychiatric: His speech is normal and behavior is normal. Judgment and thought content normal. His mood appears anxious. His affect is not angry and not inappropriate. He is not actively hallucinating. Cognition and memory are normal. He is attentive.   Nursing note and vitals reviewed.      Assessment/Plan   Al was seen today for med refill.    Diagnoses and all orders for this visit:    Neuropathy    Anxiety  -     ALPRAZolam (XANAX) 2 MG tablet; Take 1 tablet by mouth 3 (Three) Times a Day As Needed for Anxiety.    Muscle pain    Other orders  -     HYDROcodone-acetaminophen (NORCO) 7.5-325 MG per tablet; Take 1 tablet by mouth Daily As Needed for Moderate Pain .                   I would like him to return for another visit in 3 month(s)

## 2018-05-01 NOTE — PATIENT INSTRUCTIONS
This is a 48-year-old who is here for follow-up.  I have renewed his prescriptions and would like him to call if there is a problem.

## 2018-05-15 NOTE — PATIENT INSTRUCTIONS
This is a 47-year-old gentleman who is here for follow-up.  I will request a gastroenterology consult for his reflux and abdominal discomfort.  
- - -

## 2018-06-08 ENCOUNTER — OFFICE VISIT (OUTPATIENT)
Dept: FAMILY MEDICINE CLINIC | Facility: CLINIC | Age: 48
End: 2018-06-08

## 2018-06-08 VITALS
SYSTOLIC BLOOD PRESSURE: 124 MMHG | HEART RATE: 61 BPM | DIASTOLIC BLOOD PRESSURE: 84 MMHG | TEMPERATURE: 97.9 F | HEIGHT: 72 IN | BODY MASS INDEX: 38.75 KG/M2 | OXYGEN SATURATION: 98 % | WEIGHT: 286.1 LBS

## 2018-06-08 DIAGNOSIS — B20 HUMAN IMMUNODEFICIENCY VIRUS (HIV) INFECTION (HCC): ICD-10-CM

## 2018-06-08 DIAGNOSIS — G62.9 NEUROPATHY: ICD-10-CM

## 2018-06-08 DIAGNOSIS — F41.9 ANXIETY: ICD-10-CM

## 2018-06-08 DIAGNOSIS — G89.29 CHRONIC PAIN OF LEFT KNEE: Primary | ICD-10-CM

## 2018-06-08 DIAGNOSIS — M25.562 CHRONIC PAIN OF LEFT KNEE: Primary | ICD-10-CM

## 2018-06-08 PROCEDURE — 73562 X-RAY EXAM OF KNEE 3: CPT | Performed by: FAMILY MEDICINE

## 2018-06-08 PROCEDURE — 99213 OFFICE O/P EST LOW 20 MIN: CPT | Performed by: FAMILY MEDICINE

## 2018-06-08 RX ORDER — ALPRAZOLAM 2 MG/1
2 TABLET ORAL DAILY PRN
Qty: 30 TABLET | Refills: 2 | Status: SHIPPED | OUTPATIENT
Start: 2018-06-08 | End: 2018-08-31 | Stop reason: SDUPTHER

## 2018-06-08 RX ORDER — HYDROCODONE BITARTRATE AND ACETAMINOPHEN 7.5; 325 MG/1; MG/1
1 TABLET ORAL DAILY PRN
Qty: 30 TABLET | Refills: 0 | Status: SHIPPED | OUTPATIENT
Start: 2018-06-08 | End: 2018-07-05 | Stop reason: SDUPTHER

## 2018-06-08 NOTE — PATIENT INSTRUCTIONS
This is a very nice 48-year-old who has significant loss of cartilage in the medial left knee.  I will request orthopedic consultation.

## 2018-06-08 NOTE — PROGRESS NOTES
Subjective   Al Peña is a 48 y.o. male presenting with   Chief Complaint   Patient presents with   • Peripheral Neuropathy        48-year-old white male nonsmoker here for routine follow-up for chronic painful neuropathy and severe anxiety disorder.  On my request he has been trying to taper down on his medication.  He now only uses one pain pill a day on average and 1 alprazolam.  He said that he has started using meditation and yoga to help with his anxiety.  However he says that he cannot take Tylenol because of a history of elevated liver enzymes and he does not feel that Advil or Aleve really help much with his pain.  Because the pain is widespread across the legs I do not really think that a topical is going to do much good.  I told him if he can keep it to 1 a day I would keep giving him the current prescription.    He does mention that his left knee has been bothering him for years but it has gotten a lot worse lately.  He says now if he bends down he cannot get up due to pain in the knee.  He is afraid that he has end-stage osteoarthritis but has not had this x-rayed.      Peripheral Neuropathy   Associated symptoms include arthralgias and joint swelling.        The following portions of the patient's history were reviewed and updated as appropriate: current medications, past family history, past medical history, past social history, past surgical history and problem list.    Review of Systems   Musculoskeletal: Positive for arthralgias and joint swelling.   Psychiatric/Behavioral: The patient is nervous/anxious.    All other systems reviewed and are negative.      Objective   Physical Exam   Constitutional: He is oriented to person, place, and time. He appears well-developed and well-nourished. No distress.   HENT:   Head: Normocephalic and atraumatic.   Eyes: EOM are normal. Pupils are equal, round, and reactive to light.   Neck: Normal range of motion. Neck supple.   Cardiovascular: Normal rate  and regular rhythm.    Pulmonary/Chest: Effort normal and breath sounds normal.   Musculoskeletal: He exhibits edema (modest effusion of left knee) and tenderness ( tenderness and crepitance to range of motion of left knee without gross instability). He exhibits no deformity.   Neurological: He is alert and oriented to person, place, and time.   Skin: Skin is warm and dry. He is not diaphoretic.   Psychiatric: His speech is normal and behavior is normal. Judgment and thought content normal. His mood appears anxious. His affect is not angry and not inappropriate. He is not actively hallucinating. Cognition and memory are normal. He does not exhibit a depressed mood. He is attentive.   Nursing note and vitals reviewed.      Assessment/Plan   Al was seen today for peripheral neuropathy.    Diagnoses and all orders for this visit:    Chronic pain of left knee  -     XR Knee 1 or 2 View Left (In Office)    Anxiety  -     ALPRAZolam (XANAX) 2 MG tablet; Take 1 tablet by mouth Daily As Needed for Anxiety.    Neuropathy    Human immunodeficiency virus (HIV) infection    Other orders  -     HYDROcodone-acetaminophen (NORCO) 7.5-325 MG per tablet; Take 1 tablet by mouth Daily As Needed for Moderate Pain .                   I would like him to return for another visit in 3 month(s)

## 2018-06-08 NOTE — PROGRESS NOTES
Procedure   Procedures   X Ray report:    To further evaluate this patient's clinical presentation of pain and swelling in the left knee, I have requested a knee x-ray.  There are no old x-rays to compare this to, but this shows significant loss of cartilage in the medial aspect of the knee and a small patellar spur.  There is also mild effusion on x-ray

## 2018-07-05 ENCOUNTER — TELEPHONE (OUTPATIENT)
Dept: FAMILY MEDICINE CLINIC | Facility: CLINIC | Age: 48
End: 2018-07-05

## 2018-07-05 RX ORDER — HYDROCODONE BITARTRATE AND ACETAMINOPHEN 7.5; 325 MG/1; MG/1
1 TABLET ORAL DAILY PRN
Qty: 30 TABLET | Refills: 0 | Status: SHIPPED | OUTPATIENT
Start: 2018-07-05 | End: 2018-08-03 | Stop reason: SDUPTHER

## 2018-08-02 ENCOUNTER — TELEPHONE (OUTPATIENT)
Dept: FAMILY MEDICINE CLINIC | Facility: CLINIC | Age: 48
End: 2018-08-02

## 2018-08-02 NOTE — TELEPHONE ENCOUNTER
Pt called would like refill of hydrocodone    Will  Friday    Call when ready [phone]    Last seen 6/18 quyen 7/18

## 2018-08-03 RX ORDER — HYDROCODONE BITARTRATE AND ACETAMINOPHEN 7.5; 325 MG/1; MG/1
1 TABLET ORAL DAILY PRN
Qty: 30 TABLET | Refills: 0 | Status: SHIPPED | OUTPATIENT
Start: 2018-08-03 | End: 2018-08-31 | Stop reason: SDUPTHER

## 2018-08-31 ENCOUNTER — OFFICE VISIT (OUTPATIENT)
Dept: FAMILY MEDICINE CLINIC | Facility: CLINIC | Age: 48
End: 2018-08-31

## 2018-08-31 VITALS
HEART RATE: 77 BPM | BODY MASS INDEX: 37.37 KG/M2 | WEIGHT: 275.9 LBS | DIASTOLIC BLOOD PRESSURE: 68 MMHG | OXYGEN SATURATION: 97 % | SYSTOLIC BLOOD PRESSURE: 116 MMHG | TEMPERATURE: 98.3 F | HEIGHT: 72 IN

## 2018-08-31 DIAGNOSIS — G62.9 NEUROPATHY: ICD-10-CM

## 2018-08-31 DIAGNOSIS — B20 HUMAN IMMUNODEFICIENCY VIRUS (HIV) INFECTION (HCC): ICD-10-CM

## 2018-08-31 DIAGNOSIS — M25.562 CHRONIC PAIN OF LEFT KNEE: Primary | ICD-10-CM

## 2018-08-31 DIAGNOSIS — F41.9 ANXIETY: ICD-10-CM

## 2018-08-31 DIAGNOSIS — R19.7 DIARRHEA OF PRESUMED INFECTIOUS ORIGIN: ICD-10-CM

## 2018-08-31 DIAGNOSIS — G89.29 CHRONIC PAIN OF LEFT KNEE: Primary | ICD-10-CM

## 2018-08-31 PROCEDURE — 99213 OFFICE O/P EST LOW 20 MIN: CPT | Performed by: FAMILY MEDICINE

## 2018-08-31 RX ORDER — HYDROCODONE BITARTRATE AND ACETAMINOPHEN 7.5; 325 MG/1; MG/1
1 TABLET ORAL DAILY PRN
Qty: 30 TABLET | Refills: 0 | Status: SHIPPED | OUTPATIENT
Start: 2018-08-31 | End: 2018-09-10 | Stop reason: SDUPTHER

## 2018-08-31 RX ORDER — ALPRAZOLAM 2 MG/1
2 TABLET ORAL DAILY PRN
Qty: 30 TABLET | Refills: 2 | Status: SHIPPED | OUTPATIENT
Start: 2018-08-31 | End: 2018-11-26 | Stop reason: SDUPTHER

## 2018-09-10 RX ORDER — HYDROCODONE BITARTRATE AND ACETAMINOPHEN 7.5; 325 MG/1; MG/1
1 TABLET ORAL DAILY PRN
Qty: 30 TABLET | Refills: 0 | Status: SHIPPED | OUTPATIENT
Start: 2018-09-10 | End: 2018-09-11 | Stop reason: SDUPTHER

## 2018-09-12 RX ORDER — HYDROCODONE BITARTRATE AND ACETAMINOPHEN 7.5; 325 MG/1; MG/1
1 TABLET ORAL DAILY PRN
Qty: 30 TABLET | Refills: 0 | Status: SHIPPED | OUTPATIENT
Start: 2018-10-30 | End: 2018-11-26 | Stop reason: SDUPTHER

## 2018-09-25 ENCOUNTER — TELEPHONE (OUTPATIENT)
Dept: FAMILY MEDICINE CLINIC | Facility: CLINIC | Age: 48
End: 2018-09-25

## 2018-09-25 NOTE — TELEPHONE ENCOUNTER
Aline,  Pt had orders for labs on 8/31/18 and nothing schedule to come in and do. Please call pt and see if he still wants them.  This is in your overdue task thanks

## 2018-11-26 ENCOUNTER — OFFICE VISIT (OUTPATIENT)
Dept: FAMILY MEDICINE CLINIC | Facility: CLINIC | Age: 48
End: 2018-11-26

## 2018-11-26 VITALS
BODY MASS INDEX: 37.38 KG/M2 | HEIGHT: 72 IN | OXYGEN SATURATION: 96 % | TEMPERATURE: 97.4 F | SYSTOLIC BLOOD PRESSURE: 112 MMHG | DIASTOLIC BLOOD PRESSURE: 78 MMHG | WEIGHT: 276 LBS | RESPIRATION RATE: 14 BRPM | HEART RATE: 69 BPM

## 2018-11-26 DIAGNOSIS — G62.9 NEUROPATHY: Primary | ICD-10-CM

## 2018-11-26 DIAGNOSIS — B37.0 ORAL CANDIDIASIS: ICD-10-CM

## 2018-11-26 DIAGNOSIS — F41.9 ANXIETY: ICD-10-CM

## 2018-11-26 DIAGNOSIS — L40.9 PSORIASIS: ICD-10-CM

## 2018-11-26 DIAGNOSIS — Z23 NEED FOR VACCINATION: ICD-10-CM

## 2018-11-26 PROCEDURE — 90471 IMMUNIZATION ADMIN: CPT | Performed by: FAMILY MEDICINE

## 2018-11-26 PROCEDURE — 99213 OFFICE O/P EST LOW 20 MIN: CPT | Performed by: FAMILY MEDICINE

## 2018-11-26 PROCEDURE — 90674 CCIIV4 VAC NO PRSV 0.5 ML IM: CPT | Performed by: FAMILY MEDICINE

## 2018-11-26 RX ORDER — ACYCLOVIR 800 MG/1
TABLET ORAL
COMMUNITY
Start: 2018-11-07 | End: 2019-01-29

## 2018-11-26 RX ORDER — ALPRAZOLAM 2 MG/1
2 TABLET ORAL DAILY PRN
Qty: 30 TABLET | Refills: 0 | Status: SHIPPED | OUTPATIENT
Start: 2018-11-26 | End: 2019-01-16 | Stop reason: SDUPTHER

## 2018-11-26 RX ORDER — FLUCONAZOLE 200 MG/1
TABLET ORAL
Qty: 14 TABLET | Refills: 0 | Status: SHIPPED | OUTPATIENT
Start: 2018-11-26 | End: 2019-01-16

## 2018-11-26 RX ORDER — HYDROCODONE BITARTRATE AND ACETAMINOPHEN 7.5; 325 MG/1; MG/1
1 TABLET ORAL DAILY PRN
Qty: 30 TABLET | Refills: 0 | Status: SHIPPED | OUTPATIENT
Start: 2018-11-26 | End: 2018-12-21 | Stop reason: SDUPTHER

## 2018-11-26 NOTE — PROGRESS NOTES
Subjective   Al Peña is a 48 y.o. male.     Chronic problem follow-up.  He is back on his antiviral therapy after a hiatus of several months.  His ID doctor sent him for scription to get him restarted that he will not see the ID doctor until the new year.  He's having no problems at this point with that.  He is continuing to use 1 hydrocodone tablet in the evenings for help with what is primarily muscular discomfort.  [place] to be related to his neuropathy.  He developed neuropathy sometime ago from his early HIV medication.  Continues to use alprazolam once a day for his anxiety disorder.  Has cut back on these medications.  He Dr. Esquivel have discussed the risks of concomitant benzo opioid administration.    He has psoriasis and is concerned about some lesions on his right wrist and hand that are somewhat worsened.  This is exacerbated by the cracking skin that comes with this time of year.  Not presently seeing a dermatologist.    Is concerned that he may have developed some thrush.  Has used inhaler some recently.    Wants to get his flu shot today.  Was hospitalized with a near lethal case of influenza not terribly long ago.             The following portions of the patient's history were reviewed and updated as appropriate: allergies, current medications, past family history, past medical history, past social history, past surgical history and problem list.    Review of Systems   Constitutional: Negative for chills and fever.   HENT: Negative for congestion, rhinorrhea and sore throat.    Eyes: Negative for discharge and visual disturbance.   Respiratory: Negative for cough and shortness of breath.    Cardiovascular: Negative for chest pain.   Gastrointestinal: Negative for abdominal pain, constipation, diarrhea, nausea and vomiting.   Endocrine: Negative for polydipsia.   Genitourinary: Negative for dysuria and hematuria.   Musculoskeletal: Positive for myalgias. Negative for arthralgias.   Skin:  Positive for rash.   Allergic/Immunologic: Negative.    Neurological: Negative for weakness, numbness and headaches.   Hematological: Does not bruise/bleed easily.   Psychiatric/Behavioral: Negative for dysphoric mood. The patient is nervous/anxious.    All other systems reviewed and are negative.      Objective   Physical Exam   Constitutional: He is oriented to person, place, and time. He appears well-developed and well-nourished.   HENT:   Head: Normocephalic and atraumatic.   Substantial white coating on the tongue that could be consistent with oral candidiasis.   Eyes: Conjunctivae and EOM are normal.   Neck: Normal range of motion.   Pulmonary/Chest: Effort normal.   Musculoskeletal: Normal range of motion.   Neurological: He is alert and oriented to person, place, and time.   Skin: Skin is warm and dry.   Hyperkeratotic psoriatic-looking rash at the left wrist and a couple spots on his hand.  It is also dry.   Psychiatric: He has a normal mood and affect. His behavior is normal. Judgment and thought content normal.   Nursing note and vitals reviewed.    Labs in September showed good CBC and renal and hepatic function.    Lengthy discussion about the importance of staying on his HIV meds to avoid problems with resistance and mutation.  Talked about symptomatic treatment of anxiety disorders with benzos versus treating the underlying chemical disorder.  In some difficulty with attempt at this past with what was probably an antidepressant anxiolytic agent.  He currently developed worsening depression on that.  I did advise him of the availability of genomic testing.    Assessment/Plan   Al was seen today for anxiety, med refill and eczema.    Diagnoses and all orders for this visit:    Neuropathy  -     HYDROcodone-acetaminophen (NORCO) 7.5-325 MG per tablet; Take 1 tablet by mouth Daily As Needed for Moderate Pain .    Anxiety  -     ALPRAZolam (XANAX) 2 MG tablet; Take 1 tablet by mouth Daily As Needed for  Anxiety.    Psoriasis  -     fluocinonide (LIDEX) 0.05 % cream; Apply to affected area BID  -     Ambulatory Referral to Dermatology    Oral candidiasis  -     fluconazole (DIFLUCAN) 200 MG tablet; 1 tab po QDay for two weeks for thrush    Need for vaccination    Other orders  -     Flucelvax Quad=>4Years (PFS)      Patient Instructions   Steroid cream as prescribed for psoriasis  Dermatology referral is made--his office or our office will contact you    Refills sent    Schedule 3 month visit with Dr Esquivel    You are given your flu vaccine    See your pharmacist about the Hepatitis A vaccine--two shots,six months apart        EMR Dragon/Transcription disclaimer:   Much of this encounter note is an electronic transcription/translation of spoken language to printed text. The electronic translation of spoken language may permit erroneous, or at times, nonsensical words or phrases to be inadvertently transcribed; Although I have reviewed the note for such errors, some may still exist. Please contact me with any questions or concerns about the conduct of this encounter note.

## 2018-11-26 NOTE — PATIENT INSTRUCTIONS
Steroid cream as prescribed for psoriasis  Dermatology referral is made--his office or our office will contact you    Refills sent    Schedule 3 month visit with Dr Esquivel    You are given your flu vaccine    See your pharmacist about the Hepatitis A vaccine--two shots,six months apart

## 2018-12-20 DIAGNOSIS — G62.9 NEUROPATHY: ICD-10-CM

## 2018-12-20 RX ORDER — HYDROCODONE BITARTRATE AND ACETAMINOPHEN 7.5; 325 MG/1; MG/1
1 TABLET ORAL DAILY PRN
Qty: 30 TABLET | Refills: 0 | Status: CANCELLED | OUTPATIENT
Start: 2018-12-20

## 2018-12-21 DIAGNOSIS — G62.9 NEUROPATHY: ICD-10-CM

## 2018-12-21 RX ORDER — HYDROCODONE BITARTRATE AND ACETAMINOPHEN 7.5; 325 MG/1; MG/1
1 TABLET ORAL DAILY PRN
Qty: 30 TABLET | Refills: 0 | Status: SHIPPED | OUTPATIENT
Start: 2018-12-21 | End: 2019-01-25

## 2018-12-26 ENCOUNTER — TELEPHONE (OUTPATIENT)
Dept: FAMILY MEDICINE CLINIC | Facility: CLINIC | Age: 48
End: 2018-12-26

## 2018-12-26 NOTE — TELEPHONE ENCOUNTER
Pt called has symptom of bleeding for a while   Hemoglobin is low  And he has shingles   Called pt and lmom to go to ER per dr vigil

## 2019-01-14 ENCOUNTER — OFFICE VISIT (OUTPATIENT)
Dept: FAMILY MEDICINE CLINIC | Facility: CLINIC | Age: 49
End: 2019-01-14

## 2019-01-14 VITALS
HEART RATE: 86 BPM | SYSTOLIC BLOOD PRESSURE: 118 MMHG | OXYGEN SATURATION: 98 % | DIASTOLIC BLOOD PRESSURE: 84 MMHG | HEIGHT: 72 IN | BODY MASS INDEX: 36.33 KG/M2 | TEMPERATURE: 97.7 F | WEIGHT: 268.2 LBS

## 2019-01-14 DIAGNOSIS — R19.7 DIARRHEA, UNSPECIFIED TYPE: ICD-10-CM

## 2019-01-14 DIAGNOSIS — B15.9 VIRAL HEPATITIS A WITHOUT HEPATIC COMA: Primary | ICD-10-CM

## 2019-01-14 DIAGNOSIS — K62.5 RECTAL BLEEDING: ICD-10-CM

## 2019-01-14 DIAGNOSIS — B20 HIV (HUMAN IMMUNODEFICIENCY VIRUS INFECTION) (HCC): ICD-10-CM

## 2019-01-14 PROCEDURE — 99214 OFFICE O/P EST MOD 30 MIN: CPT | Performed by: NURSE PRACTITIONER

## 2019-01-14 NOTE — PROGRESS NOTES
Subjective   Al Peña is a 48 y.o. male presents with one month history of diarrhea, abdominal pain.     HIV positive, recently started back on his medications  Recent hospitalization, follow up scheduled with Dr. Cox, GI scheduled the end of the month  Hep A positive  Started on zithromax and mepron  Still with abdominal cramping, dark stools, malodorous, states concerned for GI bleed  Pain is not improving, reports worsening.     Abdominal Pain   This is a new problem. The current episode started 1 to 4 weeks ago. The onset quality is sudden. The problem occurs daily. The most recent episode lasted 4 weeks. The problem has been unchanged. The pain is located in the RLQ. The pain is at a severity of 6/10. The pain is moderate. The quality of the pain is cramping and aching. The abdominal pain does not radiate. Associated symptoms include diarrhea, nausea and weight loss. Pertinent negatives include no anorexia, arthralgias, belching, constipation, dysuria, fever, flatus, frequency, headaches, hematochezia, hematuria, melena, myalgias or vomiting. Nothing aggravates the pain. The pain is relieved by bowel movements. He has tried proton pump inhibitors for the symptoms. The treatment provided no relief. Prior diagnostic workup includes GI consult, lower endoscopy and upper endoscopy.   Abdominal Cramping   This is a new problem. The current episode started 1 to 4 weeks ago. The onset quality is sudden. The problem occurs intermittently. The problem has been waxing and waning. The pain is located in the RLQ. The pain is at a severity of 6/10. The pain is moderate. The quality of the pain is cramping. Associated symptoms include diarrhea, nausea and weight loss. Pertinent negatives include no anorexia, arthralgias, belching, constipation, dysuria, fever, flatus, frequency, headaches, hematochezia, hematuria, melena, myalgias or vomiting. Nothing aggravates the pain. The pain is relieved by bowel movements.  He has tried proton pump inhibitors for the symptoms. The treatment provided no relief. Prior diagnostic workup includes GI consult, lower endoscopy and upper endoscopy.   Diarrhea    This is a new problem. The current episode started 1 to 4 weeks ago. The problem occurs 2 to 4 times per day. The problem has been unchanged. The stool consistency is described as blood tinged. The patient states that diarrhea does not awaken him from sleep. Associated symptoms include abdominal pain and weight loss. Pertinent negatives include no arthralgias, bloating, chills, coughing, fever, headaches, increased  flatus, myalgias, sweats, URI or vomiting. Nothing aggravates the symptoms. Risk factors include recent hospitalization. He has tried increased fluids and change of diet for the symptoms. The treatment provided mild relief.        The following portions of the patient's history were reviewed and updated as appropriate: allergies, current medications, past family history, past medical history, past social history, past surgical history and problem list.    Review of Systems   Constitutional: Positive for weight loss. Negative for chills and fever.   HENT: Negative.    Eyes: Negative.    Respiratory: Negative.  Negative for cough.    Cardiovascular: Negative.    Gastrointestinal: Positive for abdominal pain, anal bleeding (bright red, treated with hydrocortisone), blood in stool (also reports dark tarry stools, malodorous, ), diarrhea, nausea and rectal pain. Negative for anorexia, bloating, constipation, flatus, hematochezia, melena and vomiting.   Endocrine: Negative.    Genitourinary: Negative.  Negative for dysuria, frequency and hematuria.   Musculoskeletal: Negative.  Negative for arthralgias and myalgias.   Skin: Negative.    Allergic/Immunologic: Negative.    Neurological: Negative.  Negative for headaches.   Hematological: Negative.    Psychiatric/Behavioral: Negative.        Objective   Physical Exam    Constitutional: He is oriented to person, place, and time. He appears well-developed and well-nourished.   HENT:   Head: Normocephalic and atraumatic.   Eyes: Conjunctivae are normal. Pupils are equal, round, and reactive to light.   Neck: Neck supple.   Cardiovascular: Normal rate, regular rhythm and normal heart sounds. Exam reveals no gallop and no friction rub.   No murmur heard.  Pulmonary/Chest: Effort normal and breath sounds normal. No stridor. No respiratory distress. He has no wheezes.   Abdominal: Soft. Bowel sounds are normal. He exhibits no distension and no mass. There is tenderness (RLQ). There is no guarding.   Neurological: He is alert and oriented to person, place, and time.   Skin: Skin is warm and dry.   Psychiatric: He has a normal mood and affect.   Vitals reviewed.      Assessment/Plan   Al was seen today for abdominal pain, vomiting, abdominal cramping and diarrhea.    Diagnoses and all orders for this visit:    Viral hepatitis A without hepatic coma    Diarrhea, unspecified type  -     Gastrointestinal Panel, PCR - Stool, Per Rectum; Future  -     Cancel: POCT CBC  -     CBC & Differential  -     Comprehensive metabolic panel  -     Ambulatory Referral to General Surgery  -     Gastrointestinal Panel, PCR - Stool, Per Rectum    Rectal bleeding  -     CBC & Differential  -     Comprehensive metabolic panel  -     Ambulatory Referral to General Surgery    HIV (human immunodeficiency virus infection) (CMS/HCC)    with patients complexity, recommend follow up with Dr. Esquivel as scheduled on Wednesday  Would consider FMLA until symptoms improve  Will need follow up with ID and GI/general surgery  Discussed follow up with Dr. Renee, unable to see since recently evaluated by Dr. Cox  Patient is tearful and reports pain in lower abdomen that is not improving, concern for GI bleed with symptoms post hospitalization, will check CBC and ensure hgb is stable

## 2019-01-15 LAB
ALBUMIN SERPL-MCNC: 4.4 G/DL (ref 3.5–5.2)
ALBUMIN/GLOB SERPL: 1 G/DL
ALP SERPL-CCNC: 88 U/L (ref 39–117)
ALT SERPL-CCNC: 52 U/L (ref 1–41)
AST SERPL-CCNC: 46 U/L (ref 1–40)
BASOPHILS # BLD AUTO: 0.01 10*3/MM3 (ref 0–0.2)
BASOPHILS NFR BLD AUTO: 0.2 % (ref 0–1.5)
BILIRUB SERPL-MCNC: 0.8 MG/DL (ref 0.1–1.2)
BUN SERPL-MCNC: 9 MG/DL (ref 6–20)
BUN/CREAT SERPL: 9.1 (ref 7–25)
CALCIUM SERPL-MCNC: 10.3 MG/DL (ref 8.6–10.5)
CHLORIDE SERPL-SCNC: 102 MMOL/L (ref 98–107)
CO2 SERPL-SCNC: 27.6 MMOL/L (ref 22–29)
CREAT SERPL-MCNC: 0.99 MG/DL (ref 0.76–1.27)
EOSINOPHIL # BLD AUTO: 0.29 10*3/MM3 (ref 0–0.7)
EOSINOPHIL NFR BLD AUTO: 6 % (ref 0.3–6.2)
ERYTHROCYTE [DISTWIDTH] IN BLOOD BY AUTOMATED COUNT: 14.7 % (ref 11.5–14.5)
GLOBULIN SER CALC-MCNC: 4.2 GM/DL
GLUCOSE SERPL-MCNC: 86 MG/DL (ref 65–99)
HCT VFR BLD AUTO: 46.3 % (ref 40.4–52.2)
HGB BLD-MCNC: 15.8 G/DL (ref 13.7–17.6)
IMM GRANULOCYTES # BLD AUTO: 0.02 10*3/MM3 (ref 0–0.03)
IMM GRANULOCYTES NFR BLD AUTO: 0.4 % (ref 0–0.5)
LYMPHOCYTES # BLD AUTO: 1.15 10*3/MM3 (ref 0.9–4.8)
LYMPHOCYTES NFR BLD AUTO: 23.9 % (ref 19.6–45.3)
MCH RBC QN AUTO: 29.7 PG (ref 27–32.7)
MCHC RBC AUTO-ENTMCNC: 34.1 G/DL (ref 32.6–36.4)
MCV RBC AUTO: 87 FL (ref 79.8–96.2)
MONOCYTES # BLD AUTO: 0.36 10*3/MM3 (ref 0.2–1.2)
MONOCYTES NFR BLD AUTO: 7.5 % (ref 5–12)
NEUTROPHILS # BLD AUTO: 3 10*3/MM3 (ref 1.9–8.1)
NEUTROPHILS NFR BLD AUTO: 62.4 % (ref 42.7–76)
PLATELET # BLD AUTO: 232 10*3/MM3 (ref 140–500)
POTASSIUM SERPL-SCNC: 4.7 MMOL/L (ref 3.5–5.2)
PROT SERPL-MCNC: 8.6 G/DL (ref 6–8.5)
RBC # BLD AUTO: 5.32 10*6/MM3 (ref 4.6–6)
SODIUM SERPL-SCNC: 140 MMOL/L (ref 136–145)
WBC # BLD AUTO: 4.81 10*3/MM3 (ref 4.5–10.7)

## 2019-01-16 ENCOUNTER — OFFICE VISIT (OUTPATIENT)
Dept: FAMILY MEDICINE CLINIC | Facility: CLINIC | Age: 49
End: 2019-01-16

## 2019-01-16 VITALS
TEMPERATURE: 98 F | BODY MASS INDEX: 36.21 KG/M2 | SYSTOLIC BLOOD PRESSURE: 102 MMHG | HEIGHT: 72 IN | RESPIRATION RATE: 17 BRPM | WEIGHT: 267.3 LBS | DIASTOLIC BLOOD PRESSURE: 78 MMHG | HEART RATE: 90 BPM | OXYGEN SATURATION: 97 %

## 2019-01-16 DIAGNOSIS — G62.9 NEUROPATHY: ICD-10-CM

## 2019-01-16 DIAGNOSIS — E11.69 OBESITY, DIABETES, AND HYPERTENSION SYNDROME (HCC): ICD-10-CM

## 2019-01-16 DIAGNOSIS — E11.59 OBESITY, DIABETES, AND HYPERTENSION SYNDROME (HCC): ICD-10-CM

## 2019-01-16 DIAGNOSIS — M25.562 CHRONIC PAIN OF LEFT KNEE: ICD-10-CM

## 2019-01-16 DIAGNOSIS — I15.2 OBESITY, DIABETES, AND HYPERTENSION SYNDROME (HCC): ICD-10-CM

## 2019-01-16 DIAGNOSIS — G89.29 CHRONIC PAIN OF LEFT KNEE: ICD-10-CM

## 2019-01-16 DIAGNOSIS — B20 HUMAN IMMUNODEFICIENCY VIRUS (HIV) INFECTION (HCC): ICD-10-CM

## 2019-01-16 DIAGNOSIS — E66.9 OBESITY, DIABETES, AND HYPERTENSION SYNDROME (HCC): ICD-10-CM

## 2019-01-16 DIAGNOSIS — F41.9 ANXIETY: Primary | ICD-10-CM

## 2019-01-16 PROCEDURE — 99213 OFFICE O/P EST LOW 20 MIN: CPT | Performed by: FAMILY MEDICINE

## 2019-01-16 RX ORDER — ALPRAZOLAM 2 MG/1
2 TABLET ORAL DAILY PRN
Qty: 30 TABLET | Refills: 5 | Status: SHIPPED | OUTPATIENT
Start: 2019-01-16

## 2019-01-16 NOTE — PROGRESS NOTES
Subjective   Al Peña is a 48 y.o. male presenting with   Chief Complaint   Patient presents with   • Med Management     Anxiety-Xanax   • Follow-up     [facility] admission 01/03/2019-01/07/2019 for diarrhea and abdominal pain.         This is a 48-year-old  white male nonsmoker who stopped taking his anti-retroviral medication when his  got arrested for him because laying money.  He developed diarrhea and now has a small red spot on his epigastric area that he is very concerned could be Kaposi's sarcoma.  He has gotten back on his medication under the direction of his infectious disease doctor, but his diarrhea has not yet abated.  He is very anxious and is requesting a refill on his Xanax.  He has not asked for early refills and he has an appropriate [facility] report, so I have given him a refill.         The following portions of the patient's history were reviewed and updated as appropriate: current medications, past family history, past medical history, past social history, past surgical history and problem list.    Review of Systems   Gastrointestinal: Positive for diarrhea.   Psychiatric/Behavioral: The patient is nervous/anxious.    All other systems reviewed and are negative.      Objective   Physical Exam   Constitutional: He is oriented to person, place, and time. He appears well-developed and well-nourished.   HENT:   Head: Normocephalic and atraumatic.   Eyes: EOM are normal. Pupils are equal, round, and reactive to light.   Neck: Normal range of motion. Neck supple.   Cardiovascular: Normal rate and regular rhythm.   Pulmonary/Chest: Effort normal and breath sounds normal.   Abdominal: Soft. Bowel sounds are normal. He exhibits distension (mild distention but soft). There is tenderness ( mild tenderness without rigidity or rebound).   Musculoskeletal: Normal range of motion.   Neurological: He is alert and oriented to person, place, and time.   Skin: Skin is warm and dry.         Psychiatric: His speech is normal and behavior is normal. Judgment and thought content normal. His mood appears anxious. He is not actively hallucinating. Cognition and memory are normal. He exhibits a depressed mood. He is attentive.   Nursing note and vitals reviewed.      Assessment/Plan   Al was seen today for med management and follow-up.    Diagnoses and all orders for this visit:    Anxiety  -     ALPRAZolam (XANAX) 2 MG tablet; Take 1 tablet by mouth Daily As Needed for Anxiety.    Obesity, diabetes, and hypertension syndrome (CMS/HCC)    Chronic pain of left knee    Neuropathy    Human immunodeficiency virus (HIV) infection (CMS/HCC)                   I would like him to return for another visit in 6 month(s)

## 2019-01-16 NOTE — PATIENT INSTRUCTIONS
This is a very nice 48-year-old who is here for follow-up.  I reminded him to please stay on his medication and follow-up with his gastroenterologist.

## 2019-01-18 ENCOUNTER — TELEPHONE (OUTPATIENT)
Dept: FAMILY MEDICINE CLINIC | Facility: CLINIC | Age: 49
End: 2019-01-18

## 2019-01-18 NOTE — TELEPHONE ENCOUNTER
Patient called regarding his recent visit with you, he states he was unable to have the fecal panel done, there was no order, also he needs FMLA paperwork filled out. He would like to make an appointment or drop off and have filled out ASAP due to current hardship.     He expressed a very thoughtful & kind thank you for the treatment and great care you provided him as well.

## 2019-01-18 NOTE — TELEPHONE ENCOUNTER
I had left a message with Patient to bring paperwork in as quickly as able & you would get them filled out. I advised to drop off early and we would contact him when completed,

## 2019-01-18 NOTE — TELEPHONE ENCOUNTER
Have patient bring [place] paperwork today  Check with Dr. Esquivel regarding recommendation to return to work with Hep A and HIV with low CD4

## 2019-01-24 RX ORDER — ACYCLOVIR 800 MG/1
TABLET ORAL
Qty: 30 TABLET | Refills: 4 | Status: SHIPPED | OUTPATIENT
Start: 2019-01-24

## 2019-01-25 ENCOUNTER — OFFICE VISIT (OUTPATIENT)
Dept: FAMILY MEDICINE CLINIC | Facility: CLINIC | Age: 49
End: 2019-01-25

## 2019-01-25 VITALS
TEMPERATURE: 98.5 F | SYSTOLIC BLOOD PRESSURE: 106 MMHG | HEART RATE: 67 BPM | BODY MASS INDEX: 37.53 KG/M2 | HEIGHT: 72 IN | DIASTOLIC BLOOD PRESSURE: 68 MMHG | RESPIRATION RATE: 14 BRPM | WEIGHT: 277.1 LBS

## 2019-01-25 DIAGNOSIS — H53.483 TUNNEL VISUAL FIELD CONSTRICTION OF BOTH EYES: ICD-10-CM

## 2019-01-25 DIAGNOSIS — H53.8 CLOUDY VISION: Primary | ICD-10-CM

## 2019-01-25 PROCEDURE — 99213 OFFICE O/P EST LOW 20 MIN: CPT | Performed by: NURSE PRACTITIONER

## 2019-01-25 RX ORDER — ALBUTEROL SULFATE 90 UG/1
2 AEROSOL, METERED RESPIRATORY (INHALATION) EVERY 4 HOURS PRN
Qty: 1 INHALER | Refills: 0 | Status: SHIPPED | OUTPATIENT
Start: 2019-01-25

## 2019-01-25 RX ORDER — HYDROCODONE BITARTRATE AND ACETAMINOPHEN 5; 325 MG/1; MG/1
1 TABLET ORAL DAILY PRN
Qty: 30 TABLET | Refills: 0 | Status: SHIPPED | OUTPATIENT
Start: 2019-01-25 | End: 2019-03-01

## 2019-01-25 NOTE — PROGRESS NOTES
Subjective   Al Peña is a 48 y.o. male presents   Presents with labile blood pressure, states he feels that his blood pressure is going up and down.  Feels like smoke in the room with vision, feels that he gets tunnel vision. When he was first diagnosed, thought he had CMV at onset of diagnosis of HIV    Labile hypertension, feels like his BP is decreased, reviewed previous bp with patient, appears stable  Vision issues, previous CMV, concerned for infection  HIV positive with low CD4  Positive hep A, follow up with ID and gastro in next week         The following portions of the patient's history were reviewed and updated as appropriate: allergies, current medications, past family history, past medical history, past social history, past surgical history and problem list.    Review of Systems   Constitutional: Negative.    HENT: Negative.    Eyes: Negative.    Respiratory: Negative.    Cardiovascular: Negative.    Gastrointestinal: Positive for abdominal pain and diarrhea.   Endocrine: Negative.    Genitourinary: Negative.    Musculoskeletal: Positive for arthralgias (on hydrocodone, interested in stopping medication).   Allergic/Immunologic: Negative.    Neurological: Negative.    Psychiatric/Behavioral: Negative.        Objective   Physical Exam   Constitutional: He is oriented to person, place, and time. He appears well-developed and well-nourished.   Cardiovascular: Normal rate, regular rhythm and normal heart sounds. Exam reveals no gallop and no friction rub.   No murmur heard.  Pulmonary/Chest: Effort normal and breath sounds normal. No stridor. No respiratory distress. He has no wheezes.   Abdominal: Soft. Bowel sounds are normal. He exhibits no distension. There is no tenderness.   Neurological: He is alert and oriented to person, place, and time.   Psychiatric: His mood appears anxious.   Vitals reviewed.      Assessment/Plan   Al was seen today for follow-up.    Diagnoses and all orders for  this visit:    Cloudy vision  -     Ambulatory Referral to Ophthalmology    Tunnel visual field constriction of both eyes  -     Ambulatory Referral to Ophthalmology    Other orders  -     albuterol sulfate  (90 Base) MCG/ACT inhaler; Inhale 2 puffs Every 4 (Four) Hours As Needed for Wheezing.  -     HYDROcodone-acetaminophen (NORCO) 5-325 MG per tablet; Take 1 tablet by mouth Daily As Needed for Moderate Pain .      Taper plan given, pt to start hydrocodone once daily at bedtime x 2 weeks, then take 1/2 tab nightly, then every other night until discontinued  Instructed medcation will not be continued once taper dose complete  Patient agreeable with recommendations.    Pt to follow up with ophthalmology, recommend pt call or we can schedule an appointment  Keep follow up with ID and gastro

## 2019-01-29 ENCOUNTER — APPOINTMENT (OUTPATIENT)
Dept: GENERAL RADIOLOGY | Facility: HOSPITAL | Age: 49
End: 2019-01-29

## 2019-01-29 ENCOUNTER — OFFICE VISIT (OUTPATIENT)
Dept: FAMILY MEDICINE CLINIC | Facility: CLINIC | Age: 49
End: 2019-01-29

## 2019-01-29 VITALS
WEIGHT: 278 LBS | HEIGHT: 72 IN | HEART RATE: 83 BPM | BODY MASS INDEX: 37.65 KG/M2 | RESPIRATION RATE: 20 BRPM | OXYGEN SATURATION: 98 % | SYSTOLIC BLOOD PRESSURE: 128 MMHG | DIASTOLIC BLOOD PRESSURE: 74 MMHG | TEMPERATURE: 98.5 F

## 2019-01-29 DIAGNOSIS — K52.9 GASTROENTERITIS, ACUTE: ICD-10-CM

## 2019-01-29 DIAGNOSIS — R11.0 NAUSEA: ICD-10-CM

## 2019-01-29 DIAGNOSIS — B20 HUMAN IMMUNODEFICIENCY VIRUS (HIV) INFECTION (HCC): ICD-10-CM

## 2019-01-29 DIAGNOSIS — R50.9 FEVER, UNSPECIFIED FEVER CAUSE: Primary | ICD-10-CM

## 2019-01-29 LAB
EXPIRATION DATE: NORMAL
FLUAV AG NPH QL: NEGATIVE
FLUBV AG NPH QL: NEGATIVE
INTERNAL CONTROL: NORMAL
Lab: NORMAL

## 2019-01-29 PROCEDURE — 99214 OFFICE O/P EST MOD 30 MIN: CPT | Performed by: NURSE PRACTITIONER

## 2019-01-29 PROCEDURE — 94640 AIRWAY INHALATION TREATMENT: CPT | Performed by: NURSE PRACTITIONER

## 2019-01-29 PROCEDURE — 87804 INFLUENZA ASSAY W/OPTIC: CPT | Performed by: NURSE PRACTITIONER

## 2019-01-29 RX ORDER — OSELTAMIVIR PHOSPHATE 75 MG/1
75 CAPSULE ORAL 2 TIMES DAILY
Qty: 10 CAPSULE | Refills: 0 | Status: SHIPPED | OUTPATIENT
Start: 2019-01-29 | End: 2019-02-03

## 2019-01-29 RX ORDER — PANTOPRAZOLE SODIUM 40 MG/1
40 TABLET, DELAYED RELEASE ORAL AS NEEDED
COMMUNITY
Start: 2019-01-25

## 2019-01-29 RX ORDER — ALBUTEROL SULFATE 0.63 MG/3ML
0.63 SOLUTION RESPIRATORY (INHALATION) EVERY 6 HOURS PRN
Status: DISCONTINUED | OUTPATIENT
Start: 2019-01-29 | End: 2019-01-29

## 2019-01-29 RX ORDER — GUAIFENESIN 200 MG/10ML
200 LIQUID ORAL 3 TIMES DAILY PRN
Qty: 236 ML | Refills: 0 | Status: SHIPPED | OUTPATIENT
Start: 2019-01-29 | End: 2022-05-10

## 2019-01-29 RX ORDER — ONDANSETRON 8 MG/1
8 TABLET, ORALLY DISINTEGRATING ORAL EVERY 6 HOURS PRN
Qty: 30 TABLET | Refills: 0 | Status: SHIPPED | OUTPATIENT
Start: 2019-01-29

## 2019-01-29 RX ORDER — ALBUTEROL SULFATE 0.63 MG/3ML
1 SOLUTION RESPIRATORY (INHALATION) EVERY 6 HOURS PRN
Qty: 300 ML | Refills: 0 | Status: SHIPPED | OUTPATIENT
Start: 2019-01-29

## 2019-01-29 RX ORDER — ALBUTEROL SULFATE 0.63 MG/3ML
0.63 SOLUTION RESPIRATORY (INHALATION) EVERY 6 HOURS PRN
Status: DISCONTINUED | OUTPATIENT
Start: 2019-01-29 | End: 2022-05-10

## 2019-01-29 RX ADMIN — ALBUTEROL SULFATE 0.63 MG: 0.63 SOLUTION RESPIRATORY (INHALATION) at 12:57

## 2019-01-29 NOTE — PROGRESS NOTES
Subjective   Al Peña is a 48 y.o. male.     Chief Complaint   Patient presents with   • Fever     immunocompremized      HPI: sudden onset of fever, chills, myalgias, cough that began yesterday.  MAXIMUM TEMPERATURE of 101 noted.  He's been taking ibuprofen when necessary.  It has provided some relief from the fevers but they return when ibuprofen wears off.  Cannot afford his albuterol inhaler so he has not been using this.  Tessalon has not worked in the past for cough.  He has underlying HIV, with low CD4 counts, as he was off his medicine for awhile, but is now back on it.  Has follow-up with infectious disease this Friday.  Feels like his chest is tight, but does not feel like he is short of breath.  His  spouse  had developed bronchitis last week, however his symptoms were different than patient's and he improved after about a week, and has good immune function.    Also has nausea controlled with Zofran, CAD appointment with GI today however he canceled as he was feeling so poorly.    Not had recent chest x-ray.  Does get flu vaccine yearly.    Social History     Tobacco Use   • Smoking status: Never Smoker   • Smokeless tobacco: Never Used   Substance Use Topics   • Alcohol use: No   • Drug use: No       Medications, allergies, PMH, surgical history, problem list, and social history all reviewed by myself.      Review of Systems   Constitutional: Positive for chills, fatigue and fever.   HENT: Positive for congestion and rhinorrhea. Negative for ear pain, sore throat and trouble swallowing.    Respiratory: Positive for cough (dry) and chest tightness. Negative for shortness of breath and wheezing.    Cardiovascular: Negative for chest pain and palpitations.   Gastrointestinal: Positive for abdominal pain, diarrhea and nausea. Negative for vomiting.   Musculoskeletal: Negative for myalgias.   Neurological: Negative for dizziness and light-headedness.   Psychiatric/Behavioral: Positive for dysphoric  "mood (due to spouses upcoming incarceration). The patient is nervous/anxious.        Objective   Blood pressure 128/74, pulse 83, temperature 98.5 °F (36.9 °C), temperature source Oral, resp. rate 20, height 182.9 cm (72.01\"), weight 126 kg (278 lb), SpO2 98 %.    Physical Exam   Constitutional: He is oriented to person, place, and time. He appears well-developed and well-nourished. No distress.   Does appear to feel poorly   HENT:   Head: Normocephalic and atraumatic.   Right Ear: Tympanic membrane and external ear normal.   Left Ear: Tympanic membrane and external ear normal.   Mouth/Throat: Oropharynx is clear and moist. No oropharyngeal exudate.   Eyes: Conjunctivae are normal. Right eye exhibits no discharge. Left eye exhibits no discharge.   Neck: Neck supple.   Cardiovascular: Normal rate, regular rhythm, normal heart sounds and intact distal pulses.   Pulmonary/Chest: Effort normal and breath sounds normal. No stridor. No respiratory distress. He has no wheezes. He has no rales.   Frequent dry cough noted which improved tremendously after albuterol mini neb today   Abdominal: Soft. Bowel sounds are normal. There is no tenderness.   Musculoskeletal: He exhibits no deformity.   Gait smooth   Lymphadenopathy:     He has no cervical adenopathy.   Neurological: He is alert and oriented to person, place, and time.   Skin: Skin is warm and dry. Capillary refill takes less than 2 seconds. No rash noted.   Psychiatric: He has a normal mood and affect.   Nursing note and vitals reviewed.      Assessment   Problem List Items Addressed This Visit        Other    Human immunodeficiency virus (HIV) infection (CMS/[facility])    Relevant Medications    oseltamivir (TAMIFLU) 75 MG capsule    Other Relevant Orders    XR Chest PA & Lateral      Other Visit Diagnoses     Fever, unspecified fever cause    -  Primary    Relevant Medications    albuterol (ACCUNEB) nebulizer solution 0.63 mg    Other Relevant Orders    POC Influenza A / " B (Completed)    XR Chest PA & Lateral    Gastroenteritis, acute        Relevant Medications    pantoprazole (PROTONIX) 40 MG EC tablet    ondansetron ODT (ZOFRAN-ODT) 8 MG disintegrating tablet           Procedures           Impression and Plan:  Although flu was negative will treat for presumptive flu due to immunocompromise.  Cough meds and inhaler prn.  Will get baseline CXR.  Has f/u he should keep with ID.  Instructed to go to ER for worsening symptoms.  I reinforced hazards of flu for immunocompromised patients, and encouraged him to go to the emergency room if he has not improving or if he worsens.    Health Maintenance Due   Topic Date Due   • URINE MICROALBUMIN  1971   • ANNUAL PHYSICAL  05/01/1974   • TDAP/TD VACCINES (1 - Tdap) 05/01/1990   • DIABETIC FOOT EXAM  02/05/2016   • DIABETIC EYE EXAM  02/05/2016   • LIPID PANEL  07/06/2017   • HEMOGLOBIN A1C  08/14/2018

## 2019-01-29 NOTE — PATIENT INSTRUCTIONS

## 2019-02-12 ENCOUNTER — TELEPHONE (OUTPATIENT)
Dept: FAMILY MEDICINE CLINIC | Facility: CLINIC | Age: 49
End: 2019-02-12

## 2019-02-12 NOTE — TELEPHONE ENCOUNTER
Al said he originally had appointment today with you, however had to cancel because he is still having very watery BM and at this point unable to control his bowels.     He is not happy about his Infectious Disease dr and his lack of concern for his lose of bowel control, and wants to know what he should do. States he is feeling the fluid loss and dehydration, now having bad cramps and hands and legs drawning up.    Sound like he needs to go through ER and maybe admitted for fluids and fecal study, but not sure he would be willing to comply.     Would like you to call him when able and talk to him.

## 2019-03-01 ENCOUNTER — OFFICE VISIT (OUTPATIENT)
Dept: FAMILY MEDICINE CLINIC | Facility: CLINIC | Age: 49
End: 2019-03-01

## 2019-03-01 VITALS
TEMPERATURE: 98.9 F | RESPIRATION RATE: 13 BRPM | HEIGHT: 72 IN | DIASTOLIC BLOOD PRESSURE: 82 MMHG | OXYGEN SATURATION: 98 % | BODY MASS INDEX: 39.27 KG/M2 | WEIGHT: 289.9 LBS | HEART RATE: 82 BPM | SYSTOLIC BLOOD PRESSURE: 118 MMHG

## 2019-03-01 DIAGNOSIS — G89.29 OTHER CHRONIC PAIN: Primary | ICD-10-CM

## 2019-03-01 PROCEDURE — 99213 OFFICE O/P EST LOW 20 MIN: CPT | Performed by: NURSE PRACTITIONER

## 2019-03-01 RX ORDER — HYDROCODONE BITARTRATE AND ACETAMINOPHEN 5; 325 MG/1; MG/1
1 TABLET ORAL NIGHTLY PRN
Qty: 30 TABLET | Refills: 0 | Status: SHIPPED | OUTPATIENT
Start: 2019-03-01 | End: 2022-05-10

## 2019-03-01 NOTE — PROGRESS NOTES
Subjective   Al Peña is a 48 y.o. male presents for follow up for pain. Has tried to taper his hydrocodone as discussed, with continued pain. Tried 1/2 tab of hydrocodone 5, pain was too intense and went back to 1 tablet at bedtime.      Forgetful, , social, neuro work up being done.     Tested positive for CMV, blurred vision, negative with ophthalmologist  Nerve pain, tried cymbalta, lyrica, gabapentin, tried tapering off hydrocodone but not able to tolerate, interested in referral to pain management.      Pain   This is a chronic problem. The current episode started more than 1 year ago. The problem occurs daily. The problem has been unchanged. Associated symptoms include abdominal pain, arthralgias, a change in bowel habit (diarrhea, currently under treatment by ID), fatigue, nausea and a visual change (followed by ophthalmology). Pertinent negatives include no anorexia, chest pain, chills, congestion, coughing, diaphoresis, fever, headaches, joint swelling, myalgias, neck pain, numbness, rash, sore throat, swollen glands, urinary symptoms, vertigo, vomiting or weakness. Nothing aggravates the symptoms. He has tried oral narcotics for the symptoms. The treatment provided moderate relief.        The following portions of the patient's history were reviewed and updated as appropriate: allergies, current medications, past family history, past medical history, past social history, past surgical history and problem list.    Review of Systems   Constitutional: Positive for fatigue. Negative for chills, diaphoresis and fever.   HENT: Negative for congestion and sore throat.    Respiratory: Negative for cough.    Cardiovascular: Negative for chest pain.   Gastrointestinal: Positive for abdominal pain, change in bowel habit (diarrhea, currently under treatment by ID) and nausea. Negative for anorexia and vomiting.   Musculoskeletal: Positive for arthralgias. Negative for joint swelling, myalgias and neck  pain.   Skin: Negative for rash.   Neurological: Negative for vertigo, weakness, numbness and headaches.       Objective   Physical Exam   Constitutional: He is oriented to person, place, and time. He appears well-developed and well-nourished.   Neck: Neck supple.   Cardiovascular: Normal rate, regular rhythm and normal heart sounds. Exam reveals no gallop and no friction rub.   No murmur heard.  Pulmonary/Chest: Effort normal and breath sounds normal. No stridor. No respiratory distress. He has no wheezes.   Abdominal: Soft. Bowel sounds are normal. He exhibits no distension. There is no tenderness.   Neurological: He is alert and oriented to person, place, and time.   Skin: Skin is warm and dry.   Psychiatric: He has a normal mood and affect.   Vitals reviewed.      Assessment/Plan   Al was seen today for med refill.    Diagnoses and all orders for this visit:    Other chronic pain  -     Ambulatory Referral to Pain Management    Other orders  -     HYDROcodone-acetaminophen (NORCO) 5-325 MG per tablet; Take 1 tablet by mouth At Night As Needed for Moderate Pain .        Will refer to pain management  Will continue hydrocodone once daily until next appointment  Discussed with patient if unable to keep his appointment, no further refills will not be given    Discussed xanax prescription, pt agreeable to follow up with psychiatry for continued need. Discussed this medication would not be refilled.     MATY query complete. Treatment plan to include limited course of prescribed  controlled substance. Risks including addiction, benefits, and alternatives presented to patient.

## 2021-03-08 ENCOUNTER — TELEPHONE (OUTPATIENT)
Dept: GASTROENTEROLOGY | Facility: CLINIC | Age: 51
End: 2021-03-08

## 2021-03-08 NOTE — TELEPHONE ENCOUNTER
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 Does not want to go to ER as he went to ER at [facility] and they ripped him so that is why he doesn't want to go.

## 2021-03-08 NOTE — TELEPHONE ENCOUNTER
Returned patient's phone call. He states he is having issues with constipation. He states he feels as if he has a blockage. He states he has been taking Colace and Miralax and because of his history of anal cancer he has decreased rectal tone. States he is having diarrhea oozing around the stool. Advised patient since he has not been seen by Dr. Fu, cannot advise him what medications he can take. Advised to go to the ER if he feels as if he has a blockage or is impacted. He verb understanding.

## 2021-03-11 ENCOUNTER — HOSPITAL ENCOUNTER (OUTPATIENT)
Dept: GENERAL RADIOLOGY | Facility: HOSPITAL | Age: 51
Discharge: HOME | End: 2021-03-11
Admitting: INTERNAL MEDICINE

## 2021-03-11 ENCOUNTER — OFFICE VISIT (OUTPATIENT)
Dept: GASTROENTEROLOGY | Facility: CLINIC | Age: 51
End: 2021-03-11

## 2021-03-11 VITALS — HEIGHT: 72 IN | BODY MASS INDEX: 42.66 KG/M2 | WEIGHT: 315 LBS | TEMPERATURE: 97.7 F

## 2021-03-11 DIAGNOSIS — K59.03 DRUG-INDUCED CONSTIPATION: Primary | ICD-10-CM

## 2021-03-11 PROCEDURE — 99204 OFFICE O/P NEW MOD 45 MIN: CPT | Performed by: INTERNAL MEDICINE

## 2021-03-11 PROCEDURE — 74019 RADEX ABDOMEN 2 VIEWS: CPT

## 2021-03-11 RX ORDER — DOCUSATE SODIUM 100 MG/1
100 CAPSULE, LIQUID FILLED ORAL 2 TIMES DAILY PRN
COMMUNITY
End: 2022-05-10

## 2021-03-11 RX ORDER — OXYMETAZOLINE HYDROCHLORIDE 0.05 G/100ML
2 SOLUTION NASAL 2 TIMES DAILY
COMMUNITY

## 2021-03-11 RX ORDER — HYDROCODONE BITARTRATE AND ACETAMINOPHEN 10; 325 MG/1; MG/1
1 TABLET ORAL EVERY 6 HOURS PRN
COMMUNITY

## 2021-03-11 NOTE — PROGRESS NOTES
Chief Complaint   Patient presents with   • Constipation   • Abdominal Pain     Al Peña is a 50 y.o. male who presents with a history of anal carcinoma with abdominal pain and constipation  HPI     Patient 50-year-old male with history of anal carcinoma diagnosed in 2019 presenting with constipation.  Patient on chronic narcotics taking Colace.  Patient reports 6 to 7 months abdominal pain and constipation.  Patient followed by GI and colorectal surgery but apparently having issues seeking new care.  Patient instructed to go to the ER but is resistant to this.  Patient reports was traumatized in the ER before.  Patient actually hoping that we would direct admit him for therapy for his pain and his constipation.  Patient denies any nausea vomiting tolerating diet but feels like he has difficulty with sensation in order to allow him to evacuate.  Patient denies bright red blood per rectum or melena but does have soreness in the anal area.    Past Medical History:   Diagnosis Date   • Cancer (CMS/HCC)    • Hard to intubate        Current Outpatient Medications:   •  acyclovir (ZOVIRAX) 800 MG tablet, TAKE ONE TABLET BY MOUTH THREE TIMES A DAY, Disp: 30 tablet, Rfl: 4  •  albuterol sulfate  (90 Base) MCG/ACT inhaler, Inhale 2 puffs Every 4 (Four) Hours As Needed for Wheezing., Disp: 1 inhaler, Rfl: 0  •  ALPRAZolam (XANAX) 2 MG tablet, Take 1 tablet by mouth Daily As Needed for Anxiety., Disp: 30 tablet, Rfl: 5  •  docusate sodium (COLACE) 100 MG capsule, Take 100 mg by mouth 2 (Two) Times a Day As Needed for Constipation., Disp: , Rfl:   •  guaifenesin (ROBITUSSIN) 100 MG/5ML liquid, Take 10 mL by mouth 3 (Three) Times a Day As Needed for Cough., Disp: 236 mL, Rfl: 0  •  HYDROcodone-acetaminophen (NORCO)  MG per tablet, Take 1 tablet by mouth Every 6 (Six) Hours As Needed for Moderate Pain ., Disp: , Rfl:   •  ondansetron ODT (ZOFRAN-ODT) 8 MG disintegrating tablet, Take 1 tablet by mouth Every 6  (Six) Hours As Needed for Nausea or Vomiting., Disp: 30 tablet, Rfl: 0  •  oxymetazoline (Mucinex Sinus-Max Sinus/Allrgy) 0.05 % nasal spray, 2 sprays into the nostril(s) as directed by provider 2 (Two) Times a Day., Disp: , Rfl:   •  pantoprazole (PROTONIX) 40 MG EC tablet, As Needed., Disp: , Rfl:   •  albuterol (ACCUNEB) 0.63 MG/3ML nebulizer solution, Take 3 mL by nebulization Every 6 (Six) Hours As Needed for Wheezing., Disp: 300 mL, Rfl: 0  •  azelastine (ASTELIN) 0.1 % nasal spray, 2 sprays into each nostril 2 (Two) Times a Day. Use in each nostril as directed, Disp: 30 mL, Rfl: 2  •  fluocinonide (LIDEX) 0.05 % cream, Apply to affected area BID, Disp: 30 g, Rfl: 1  •  HYDROcodone-acetaminophen (NORCO) 5-325 MG per tablet, Take 1 tablet by mouth At Night As Needed for Moderate Pain ., Disp: 30 tablet, Rfl: 0  •  hydrocortisone (ANUSOL-HC) 2.5 % rectal cream, Insert  into the rectum., Disp: , Rfl:   •  linaclotide (Linzess) 290 MCG capsule capsule, Take 1 capsule by mouth Every Morning Before Breakfast., Disp: 30 capsule, Rfl: 11    Current Facility-Administered Medications:   •  albuterol (ACCUNEB) nebulizer solution 0.63 mg, 0.63 mg, Nebulization, Q6H PRN, Yoly Burnett APRN, 0.63 mg at 01/29/19 1257  Allergies   Allergen Reactions   • Oxycodone-Acetaminophen Itching   • Promethazine Itching     swelling   • Sulfa Antibiotics      Throat closes   • Neomycin Other (See Comments)     Social History     Socioeconomic History   • Marital status:      Spouse name: Not on file   • Number of children: Not on file   • Years of education: Not on file   • Highest education level: Not on file   Tobacco Use   • Smoking status: Never Smoker   • Smokeless tobacco: Never Used   Substance and Sexual Activity   • Alcohol use: No   • Drug use: No     Family History   Problem Relation Age of Onset   • Colon cancer Maternal Grandfather      Review of Systems   Constitutional: Positive for activity change and  fatigue. Negative for appetite change, chills, diaphoresis, fever and unexpected weight change.   HENT: Negative.    Eyes: Negative.    Respiratory: Negative.    Cardiovascular: Negative.    Gastrointestinal: Positive for abdominal distention, abdominal pain, constipation and rectal pain. Negative for anal bleeding, blood in stool, diarrhea, nausea and vomiting.   Endocrine: Negative.    Musculoskeletal: Positive for arthralgias, back pain and gait problem.   Skin: Negative.    Allergic/Immunologic: Negative.    Hematological: Positive for adenopathy.     Vitals:    03/11/21 0844   Temp: 97.7 °F (36.5 °C)     Physical Exam  Vitals and nursing note reviewed.   Constitutional:       Appearance: Normal appearance. He is well-developed. He is obese.   HENT:      Head: Normocephalic and atraumatic.   Eyes:      General: No scleral icterus.     Conjunctiva/sclera: Conjunctivae normal.      Pupils: Pupils are equal, round, and reactive to light.   Neck:      Trachea: No tracheal deviation.   Cardiovascular:      Rate and Rhythm: Normal rate and regular rhythm.   Pulmonary:      Effort: Pulmonary effort is normal. No respiratory distress.      Breath sounds: Normal breath sounds.   Abdominal:      General: Bowel sounds are normal. There is no distension.      Palpations: Abdomen is soft. There is no mass.      Tenderness: There is abdominal tenderness. There is no guarding or rebound.   Musculoskeletal:         General: Normal range of motion.      Cervical back: Normal range of motion.      Right lower leg: Edema present.      Left lower leg: Edema present.      Comments: Patient examined in wheelchair   Skin:     General: Skin is warm and dry.      Coloration: Skin is not jaundiced.      Findings: No rash.   Neurological:      General: No focal deficit present.      Mental Status: He is alert and oriented to person, place, and time.   Psychiatric:         Behavior: Behavior normal.         Thought Content: Thought content  normal.      Comments: Patient tearful       Diagnoses and all orders for this visit:    Drug-induced constipation  -     XR abdomen flat and upright    Other orders  -     HYDROcodone-acetaminophen (NORCO)  MG per tablet; Take 1 tablet by mouth Every 6 (Six) Hours As Needed for Moderate Pain .  -     oxymetazoline (Mucinex Sinus-Max Sinus/Allrgy) 0.05 % nasal spray; 2 sprays into the nostril(s) as directed by provider 2 (Two) Times a Day.  -     docusate sodium (COLACE) 100 MG capsule; Take 100 mg by mouth 2 (Two) Times a Day As Needed for Constipation.  -     linaclotide (Linzess) 290 MCG capsule capsule; Take 1 capsule by mouth Every Morning Before Breakfast.    Patient 50-year-old male with history of HIV positive, anal carcinoma status post radiation therapy.  Patient with last colonoscopy October 2019 with some inflammatory changes otherwise negative.  Patient complaining of chronic constipation on chronic narcotics taking Colace.  Patient reports very large stool yesterday but still feeling impacted.  Patient with similar episode last year x-rays negative for retained stool.  Patient chose to change MDs his primary's have changed several times this year interested in changing his oncologist colorectal surgeon and now GI.  Patient tearful, reports in a lot of pain difficulty standing due to back pain issues with fatigue in diet.  For now we will send patient over for flat and upright abdomen does not want to go to the ER despite reporting terrible pain.  Patient thought maybe be direct admitted for enemas.  Will begin high-dose Linzess and follow response.

## 2021-03-12 ENCOUNTER — TELEPHONE (OUTPATIENT)
Dept: GASTROENTEROLOGY | Facility: CLINIC | Age: 51
End: 2021-03-12

## 2021-03-12 NOTE — TELEPHONE ENCOUNTER
----- Message from Maximiliano Mas Rep sent at 3/12/2021  2:17 PM EST -----  Regarding: Results/ questions  Contact: [phone]  Pt is calling to see if Xray results have come in. Also is letting MD know that the high dose of Linzess has worked to point of diarrhea. Would like know if he should continue high dose or back down some.

## 2021-03-12 NOTE — TELEPHONE ENCOUNTER
Pt requesting  Xray results     Pt states Linzess has worked to point of diarrhea. Would like know if he should continue high dose or back down some.    Msg sent to Dr Fu.

## 2021-03-15 ENCOUNTER — TELEPHONE (OUTPATIENT)
Dept: GASTROENTEROLOGY | Facility: CLINIC | Age: 51
End: 2021-03-15

## 2021-03-15 NOTE — TELEPHONE ENCOUNTER
Called pt, states Linzess 290 mcg worked well for two days and then over the next couple of days it took several hours for it to work and now it is not working at all.     Pt states he now has a fissure d/t having hard stools.      Pt also is requesting results of xray.    Advised would sent a msg to Dr Fu.    Msg sent to Dr Fu.

## 2021-03-15 NOTE — TELEPHONE ENCOUNTER
----- Message from Ric Fu MD sent at 3/15/2021  2:03 PM EDT -----  Fecal retention to the ascending colon.  Continue current medications

## 2021-03-16 ENCOUNTER — TELEPHONE (OUTPATIENT)
Dept: GASTROENTEROLOGY | Facility: CLINIC | Age: 51
End: 2021-03-16

## 2021-03-16 NOTE — TELEPHONE ENCOUNTER
Returned patient's phone call. He states he is still having issues with constipation. States he is taking Linzess 290 mcg every day, along with Miralax.   Advised patient to increase his Miralax dosing to twice a day,  Increase his liquids and to drink a bottle of Cit of Mg. If he does not want to do the bowel protocol, he may go to the ER for a fecal impaction. Advised these are the verbal orders of Dr. Fu. Patient states he will try the bowel protocol. Advised to call if the issue continues. He verb understanding.

## 2021-03-16 NOTE — TELEPHONE ENCOUNTER
----- Message from Maximiliano Gipson sent at 3/16/2021  1:52 PM EDT -----  Regarding: Constipation  Contact: [phone]  PT is still constipated and would like to discuss further please call

## 2021-04-02 ENCOUNTER — BULK ORDERING (OUTPATIENT)
Dept: CASE MANAGEMENT | Facility: OTHER | Age: 51
End: 2021-04-02

## 2021-04-02 DIAGNOSIS — Z23 IMMUNIZATION DUE: ICD-10-CM

## 2021-10-01 ENCOUNTER — APPOINTMENT (OUTPATIENT)
Dept: VACCINE CLINIC | Facility: HOSPITAL | Age: 51
End: 2021-10-01

## 2021-10-06 ENCOUNTER — APPOINTMENT (OUTPATIENT)
Dept: VACCINE CLINIC | Facility: HOSPITAL | Age: 51
End: 2021-10-06

## 2021-10-22 ENCOUNTER — APPOINTMENT (OUTPATIENT)
Dept: VACCINE CLINIC | Facility: HOSPITAL | Age: 51
End: 2021-10-22

## 2021-10-23 ENCOUNTER — IMMUNIZATION (OUTPATIENT)
Dept: VACCINE CLINIC | Facility: HOSPITAL | Age: 51
End: 2021-10-23

## 2021-10-23 PROCEDURE — 0004A HC ADM SARSCOV2 30MCG/0.3ML BOOSTER: CPT | Performed by: INTERNAL MEDICINE

## 2021-10-23 PROCEDURE — 0003A: CPT | Performed by: INTERNAL MEDICINE

## 2021-10-23 PROCEDURE — 91300 HC SARSCOV02 VAC 30MCG/0.3ML IM: CPT | Performed by: INTERNAL MEDICINE

## 2021-12-08 ENCOUNTER — OFFICE (OUTPATIENT)
Dept: URBAN - METROPOLITAN AREA CLINIC 66 | Facility: CLINIC | Age: 51
End: 2021-12-08

## 2021-12-08 DIAGNOSIS — R19.7 DIARRHEA, UNSPECIFIED: ICD-10-CM

## 2021-12-08 DIAGNOSIS — R15.9 FULL INCONTINENCE OF FECES: ICD-10-CM

## 2021-12-08 DIAGNOSIS — K59.00 CONSTIPATION, UNSPECIFIED: ICD-10-CM

## 2021-12-08 PROCEDURE — 99202 OFFICE O/P NEW SF 15 MIN: CPT | Performed by: INTERNAL MEDICINE

## 2021-12-08 RX ORDER — LINACLOTIDE 290 UG/1
CAPSULE, GELATIN COATED ORAL
Qty: 30 | Refills: 11 | Status: COMPLETED
End: 2024-02-21

## 2021-12-09 ENCOUNTER — TRANSCRIBE ORDERS (OUTPATIENT)
Dept: ADMINISTRATIVE | Facility: HOSPITAL | Age: 51
End: 2021-12-09

## 2021-12-09 DIAGNOSIS — Z01.818 OTHER SPECIFIED PRE-OPERATIVE EXAMINATION: Primary | ICD-10-CM

## 2021-12-14 ENCOUNTER — LAB (OUTPATIENT)
Dept: LAB | Facility: HOSPITAL | Age: 51
End: 2021-12-14

## 2021-12-14 DIAGNOSIS — Z01.818 OTHER SPECIFIED PRE-OPERATIVE EXAMINATION: ICD-10-CM

## 2021-12-14 LAB — SARS-COV-2 ORF1AB RESP QL NAA+PROBE: NOT DETECTED

## 2021-12-14 PROCEDURE — U0004 COV-19 TEST NON-CDC HGH THRU: HCPCS

## 2021-12-14 PROCEDURE — C9803 HOPD COVID-19 SPEC COLLECT: HCPCS

## 2021-12-15 ENCOUNTER — ANESTHESIA (OUTPATIENT)
Dept: GASTROENTEROLOGY | Facility: HOSPITAL | Age: 51
End: 2021-12-15

## 2021-12-15 ENCOUNTER — HOSPITAL ENCOUNTER (OUTPATIENT)
Facility: HOSPITAL | Age: 51
Setting detail: HOSPITAL OUTPATIENT SURGERY
Discharge: HOME | End: 2021-12-15
Attending: INTERNAL MEDICINE | Admitting: INTERNAL MEDICINE

## 2021-12-15 ENCOUNTER — ANESTHESIA EVENT (OUTPATIENT)
Dept: GASTROENTEROLOGY | Facility: HOSPITAL | Age: 51
End: 2021-12-15

## 2021-12-15 ENCOUNTER — ON CAMPUS - OUTPATIENT (OUTPATIENT)
Dept: URBAN - METROPOLITAN AREA HOSPITAL 114 | Facility: HOSPITAL | Age: 51
End: 2021-12-15

## 2021-12-15 VITALS
SYSTOLIC BLOOD PRESSURE: 115 MMHG | TEMPERATURE: 98.2 F | DIASTOLIC BLOOD PRESSURE: 71 MMHG | HEART RATE: 81 BPM | WEIGHT: 315 LBS | BODY MASS INDEX: 38.36 KG/M2 | RESPIRATION RATE: 18 BRPM | HEIGHT: 76 IN | OXYGEN SATURATION: 93 %

## 2021-12-15 DIAGNOSIS — R13.10 DYSPHAGIA: ICD-10-CM

## 2021-12-15 DIAGNOSIS — K29.50 UNSPECIFIED CHRONIC GASTRITIS WITHOUT BLEEDING: ICD-10-CM

## 2021-12-15 DIAGNOSIS — R15.9 FULL INCONTINENCE OF FECES: ICD-10-CM

## 2021-12-15 DIAGNOSIS — K20.80 OTHER ESOPHAGITIS WITHOUT BLEEDING: ICD-10-CM

## 2021-12-15 DIAGNOSIS — R15.9 FECAL INCONTINENCE: ICD-10-CM

## 2021-12-15 DIAGNOSIS — K55.9 VASCULAR DISORDER OF INTESTINE, UNSPECIFIED: ICD-10-CM

## 2021-12-15 DIAGNOSIS — K62.89 OTHER SPECIFIED DISEASES OF ANUS AND RECTUM: ICD-10-CM

## 2021-12-15 DIAGNOSIS — R13.14 DYSPHAGIA, PHARYNGOESOPHAGEAL PHASE: ICD-10-CM

## 2021-12-15 DIAGNOSIS — K63.89 OTHER SPECIFIED DISEASES OF INTESTINE: ICD-10-CM

## 2021-12-15 DIAGNOSIS — K22.2 ESOPHAGEAL OBSTRUCTION: ICD-10-CM

## 2021-12-15 LAB — GLUCOSE BLDC GLUCOMTR-MCNC: 165 MG/DL (ref 70–130)

## 2021-12-15 PROCEDURE — 45380 COLONOSCOPY AND BIOPSY: CPT | Performed by: INTERNAL MEDICINE

## 2021-12-15 PROCEDURE — 25010000002 PHENYLEPHRINE 10 MG/ML SOLUTION: Performed by: ANESTHESIOLOGY

## 2021-12-15 PROCEDURE — 88305 TISSUE EXAM BY PATHOLOGIST: CPT | Performed by: INTERNAL MEDICINE

## 2021-12-15 PROCEDURE — 25010000002 PROPOFOL 10 MG/ML EMULSION: Performed by: ANESTHESIOLOGY

## 2021-12-15 PROCEDURE — 43450 DILATE ESOPHAGUS 1/MULT PASS: CPT | Performed by: INTERNAL MEDICINE

## 2021-12-15 PROCEDURE — 43239 EGD BIOPSY SINGLE/MULTIPLE: CPT | Performed by: INTERNAL MEDICINE

## 2021-12-15 PROCEDURE — 82962 GLUCOSE BLOOD TEST: CPT

## 2021-12-15 RX ORDER — SODIUM CHLORIDE, SODIUM LACTATE, POTASSIUM CHLORIDE, CALCIUM CHLORIDE 600; 310; 30; 20 MG/100ML; MG/100ML; MG/100ML; MG/100ML
30 INJECTION, SOLUTION INTRAVENOUS CONTINUOUS PRN
Status: DISCONTINUED | OUTPATIENT
Start: 2021-12-15 | End: 2021-12-15 | Stop reason: HOSPADM

## 2021-12-15 RX ORDER — PROPOFOL 10 MG/ML
VIAL (ML) INTRAVENOUS AS NEEDED
Status: DISCONTINUED | OUTPATIENT
Start: 2021-12-15 | End: 2021-12-15 | Stop reason: SURG

## 2021-12-15 RX ORDER — PHENYLEPHRINE HYDROCHLORIDE 10 MG/ML
INJECTION INTRAVENOUS AS NEEDED
Status: DISCONTINUED | OUTPATIENT
Start: 2021-12-15 | End: 2021-12-15 | Stop reason: SURG

## 2021-12-15 RX ORDER — DULAGLUTIDE 0.75 MG/.5ML
1 INJECTION, SOLUTION SUBCUTANEOUS WEEKLY
COMMUNITY

## 2021-12-15 RX ORDER — LIDOCAINE HYDROCHLORIDE 20 MG/ML
INJECTION, SOLUTION INFILTRATION; PERINEURAL AS NEEDED
Status: DISCONTINUED | OUTPATIENT
Start: 2021-12-15 | End: 2021-12-15 | Stop reason: SURG

## 2021-12-15 RX ORDER — EPHEDRINE SULFATE 50 MG/ML
INJECTION, SOLUTION INTRAVENOUS AS NEEDED
Status: DISCONTINUED | OUTPATIENT
Start: 2021-12-15 | End: 2021-12-15 | Stop reason: SURG

## 2021-12-15 RX ORDER — PROPOFOL 10 MG/ML
VIAL (ML) INTRAVENOUS CONTINUOUS PRN
Status: DISCONTINUED | OUTPATIENT
Start: 2021-12-15 | End: 2021-12-15 | Stop reason: SURG

## 2021-12-15 RX ADMIN — EPHEDRINE SULFATE 5 MG: 50 INJECTION INTRAVENOUS at 08:27

## 2021-12-15 RX ADMIN — PHENYLEPHRINE HYDROCHLORIDE 100 MCG: 10 INJECTION INTRAVENOUS at 08:27

## 2021-12-15 RX ADMIN — PROPOFOL 200 MG: 10 INJECTION, EMULSION INTRAVENOUS at 08:12

## 2021-12-15 RX ADMIN — LIDOCAINE HYDROCHLORIDE 60 MG: 20 INJECTION, SOLUTION INFILTRATION; PERINEURAL at 08:12

## 2021-12-15 RX ADMIN — SODIUM CHLORIDE, POTASSIUM CHLORIDE, SODIUM LACTATE AND CALCIUM CHLORIDE 30 ML/HR: 600; 310; 30; 20 INJECTION, SOLUTION INTRAVENOUS at 08:03

## 2021-12-15 RX ADMIN — Medication 184 MCG/KG/MIN: at 08:12

## 2021-12-15 NOTE — H&P
[facility]   HISTORY AND PHYSICAL    Patient Name: Al Peña  : 1970  MRN: [id number]  Primary Care Physician:  Provider, No Known  Date of admission: 12/15/2021    Subjective   Subjective     Chief Complaint: intermittent trouble swallowing    History of Present Illness  Has history of anal cancel  Constipation ,fecal incontinence  Review of Systems   HENT: Positive for trouble swallowing.    Gastrointestinal: Positive for constipation.        Personal History     Past Medical History:   Diagnosis Date   • Cancer (HCC)    • Diabetes mellitus (HCC)    • Hard to intubate        Past Surgical History:   Procedure Laterality Date   • AXILLARY LYMPH NODE BIOPSY/EXCISION Left    • CHOLECYSTECTOMY     • COLONOSCOPY N/A 2016    Procedure: COLONOSCOPY TO CECUM;  Surgeon: Georges Gusman MD;  Location: [facility] ENDOSCOPY;  Service:    • ENDOSCOPY N/A 2016    Procedure: ESOPHAGOGASTRODUODENOSCOPY WITH COLD BIOPSIES;  Surgeon: Georges Gusman MD;  Location: [facility] ENDOSCOPY;  Service:    • TONSILLECTOMY     • TOOTH EXTRACTION  10/15/2018       Family History: family history includes Colon cancer in his maternal grandfather. Otherwise pertinent FHx was reviewed and not pertinent to current issue.    Social History:  reports that he has never smoked. He has never used smokeless tobacco. He reports that he does not drink alcohol and does not use drugs.    Home Medications:  ALPRAZolam, Bictegravir-Emtricitab-Tenofov, Dulaglutide, HYDROcodone-acetaminophen, Testosterone, acyclovir, albuterol, albuterol sulfate HFA, azelastine, docusate sodium, fluocinonide, guaifenesin, hydrocortisone, linaclotide, ondansetron ODT, oxymetazoline, and pantoprazole    Allergies:  Allergies   Allergen Reactions   • Oxycodone-Acetaminophen Itching   • Promethazine Itching     swelling   • Sulfa Antibiotics      Throat closes   • Neomycin Other (See Comments)       Objective    Objective     Vitals:   Temp:  [98.2 °F (36.8  °C)] 98.2 °F (36.8 °C)  Heart Rate:  [91] 91  Resp:  [22] 22  BP: (128)/(76) 128/76    Physical Exam  HENT:      Right Ear: External ear normal.      Left Ear: External ear normal.      Mouth/Throat:      Pharynx: Oropharynx is clear.   Eyes:      Conjunctiva/sclera: Conjunctivae normal.   Cardiovascular:      Rate and Rhythm: Normal rate.      Pulses: Normal pulses.   Pulmonary:      Effort: Pulmonary effort is normal.   Abdominal:      General: Abdomen is flat.   Skin:     General: Skin is warm and dry.   Neurological:      General: No focal deficit present.      Mental Status: He is alert.   Psychiatric:         Mood and Affect: Mood normal.         Result Review    Result Review:  I have personally reviewed the results from the time of this admission to 12/15/2021 08:08 EST and agree with these findings:  []  Laboratory  []  Microbiology  []  Radiology  []  EKG/Telemetry   []  Cardiology/Vascular   []  Pathology  []  Old records  []  Other:    Assessment/Plan   Assessment / Plan     Brief Patient Summary:  Al Peña is a 51 y.o. male   Solid food dysphagia  Fecal incontinence  constipation    Active Hospital Problems:  There are no active hospital problems to display for this patient.    Plan: Upper and lower tract endoscopy, risks, alternatives and benefits dicussed  with patient and patient is agreeable to proceed.      DVT prophylaxis:  No DVT prophylaxis order currently exists.    CODE STATUS:       Admission Status:  I believe this patient meets outpatient  status.    Celso Odonnell MD

## 2021-12-15 NOTE — ANESTHESIA POSTPROCEDURE EVALUATION
Patient: Al Peña    Procedure Summary     Date: 12/15/21 Room / Location:  [facility] /  [facility]    Anesthesia Start: 0806 Anesthesia Stop: 0847    Procedures:       ESOPHAGOGASTRODUODENOSCOPY WITH BIOPSIES, 54FR LEZAMA DILATATION (N/A Esophagus)      COLONOSCOPY INTO CECUM AND TI WITH BIOPSIES (N/A ) Diagnosis:     Surgeons: Celso Odonnell MD Provider: Shayne Bernal MD    Anesthesia Type: MAC ASA Status: 3          Anesthesia Type: MAC    Vitals  No vitals data found for the desired time range.          Post Anesthesia Care and Evaluation    Patient location during evaluation: PHASE II  Patient participation: complete - patient participated  Level of consciousness: awake and alert  Pain management: adequate  Airway patency: patent  Anesthetic complications: No anesthetic complications  PONV Status: none  Cardiovascular status: acceptable and hemodynamically stable  Respiratory status: acceptable, nonlabored ventilation and spontaneous ventilation  Hydration status: acceptable

## 2021-12-15 NOTE — ANESTHESIA PREPROCEDURE EVALUATION
Anesthesia Evaluation     Patient summary reviewed and Nursing notes reviewed   history of anesthetic complications: difficult airway               Airway   Mallampati: III  TM distance: >3 FB  Neck ROM: full  Possible difficult intubation  Dental - normal exam     Pulmonary - normal exam   Cardiovascular - normal exam    (+) hypertension less than 2 medications,       Neuro/Psych  (+) seizures, psychiatric history Anxiety,     GI/Hepatic/Renal/Endo    (+) obesity, morbid obesity, GERD,  diabetes mellitus type 2,     Musculoskeletal     Abdominal   (+) obese,    Substance History      OB/GYN          Other                        Anesthesia Plan    ASA 3     MAC     intravenous induction     Anesthetic plan, all risks, benefits, and alternatives have been provided, discussed and informed consent has been obtained with: patient.

## 2021-12-16 LAB
LAB AP CASE REPORT: NORMAL
PATH REPORT.FINAL DX SPEC: NORMAL
PATH REPORT.GROSS SPEC: NORMAL

## 2022-05-04 ENCOUNTER — TRANSCRIBE ORDERS (OUTPATIENT)
Dept: ADMINISTRATIVE | Facility: HOSPITAL | Age: 52
End: 2022-05-04

## 2022-05-04 DIAGNOSIS — Z01.818 OTHER SPECIFIED PRE-OPERATIVE EXAMINATION: Primary | ICD-10-CM

## 2022-05-10 ENCOUNTER — LAB (OUTPATIENT)
Dept: LAB | Facility: HOSPITAL | Age: 52
End: 2022-05-10

## 2022-05-10 DIAGNOSIS — Z01.818 OTHER SPECIFIED PRE-OPERATIVE EXAMINATION: ICD-10-CM

## 2022-05-10 LAB — SARS-COV-2 ORF1AB RESP QL NAA+PROBE: NOT DETECTED

## 2022-05-10 PROCEDURE — C9803 HOPD COVID-19 SPEC COLLECT: HCPCS

## 2022-05-10 PROCEDURE — U0004 COV-19 TEST NON-CDC HGH THRU: HCPCS

## 2022-05-10 RX ORDER — BUDESONIDE AND FORMOTEROL FUMARATE DIHYDRATE 160; 4.5 UG/1; UG/1
2 AEROSOL RESPIRATORY (INHALATION) DAILY
COMMUNITY

## 2022-05-11 ENCOUNTER — HOSPITAL ENCOUNTER (OUTPATIENT)
Facility: HOSPITAL | Age: 52
Setting detail: HOSPITAL OUTPATIENT SURGERY
Discharge: HOME | End: 2022-05-11
Attending: INTERNAL MEDICINE | Admitting: INTERNAL MEDICINE

## 2022-05-11 ENCOUNTER — ANESTHESIA (OUTPATIENT)
Dept: GASTROENTEROLOGY | Facility: HOSPITAL | Age: 52
End: 2022-05-11

## 2022-05-11 ENCOUNTER — ANESTHESIA EVENT (OUTPATIENT)
Dept: GASTROENTEROLOGY | Facility: HOSPITAL | Age: 52
End: 2022-05-11

## 2022-05-11 ENCOUNTER — ON CAMPUS - OUTPATIENT (OUTPATIENT)
Dept: URBAN - METROPOLITAN AREA HOSPITAL 114 | Facility: HOSPITAL | Age: 52
End: 2022-05-11

## 2022-05-11 VITALS
RESPIRATION RATE: 78 BRPM | HEART RATE: 84 BPM | HEIGHT: 72 IN | OXYGEN SATURATION: 96 % | WEIGHT: 315 LBS | BODY MASS INDEX: 42.66 KG/M2 | TEMPERATURE: 98.4 F | SYSTOLIC BLOOD PRESSURE: 104 MMHG | DIASTOLIC BLOOD PRESSURE: 74 MMHG

## 2022-05-11 DIAGNOSIS — K31.84 GASTROPARESIS: ICD-10-CM

## 2022-05-11 DIAGNOSIS — K22.2 ESOPHAGEAL OBSTRUCTION: ICD-10-CM

## 2022-05-11 DIAGNOSIS — K20.90 ESOPHAGITIS, UNSPECIFIED WITHOUT BLEEDING: ICD-10-CM

## 2022-05-11 DIAGNOSIS — R13.10 DYSPHAGIA, UNSPECIFIED: ICD-10-CM

## 2022-05-11 LAB
GLUCOSE BLDC GLUCOMTR-MCNC: 194 MG/DL (ref 70–130)
GLUCOSE BLDC GLUCOMTR-MCNC: 199 MG/DL (ref 70–130)

## 2022-05-11 PROCEDURE — 82962 GLUCOSE BLOOD TEST: CPT

## 2022-05-11 PROCEDURE — 43450 DILATE ESOPHAGUS 1/MULT PASS: CPT | Mod: 59 | Performed by: INTERNAL MEDICINE

## 2022-05-11 PROCEDURE — 25010000002 PROPOFOL 10 MG/ML EMULSION: Performed by: ANESTHESIOLOGY

## 2022-05-11 PROCEDURE — 43235 EGD DIAGNOSTIC BRUSH WASH: CPT | Performed by: INTERNAL MEDICINE

## 2022-05-11 RX ORDER — GLYCOPYRROLATE 0.2 MG/ML
INJECTION INTRAMUSCULAR; INTRAVENOUS AS NEEDED
Status: DISCONTINUED | OUTPATIENT
Start: 2022-05-11 | End: 2022-05-11 | Stop reason: SURG

## 2022-05-11 RX ORDER — PROPOFOL 10 MG/ML
VIAL (ML) INTRAVENOUS AS NEEDED
Status: DISCONTINUED | OUTPATIENT
Start: 2022-05-11 | End: 2022-05-11 | Stop reason: SURG

## 2022-05-11 RX ORDER — SODIUM CHLORIDE, SODIUM LACTATE, POTASSIUM CHLORIDE, CALCIUM CHLORIDE 600; 310; 30; 20 MG/100ML; MG/100ML; MG/100ML; MG/100ML
30 INJECTION, SOLUTION INTRAVENOUS CONTINUOUS PRN
Status: DISCONTINUED | OUTPATIENT
Start: 2022-05-11 | End: 2022-05-11 | Stop reason: HOSPADM

## 2022-05-11 RX ORDER — LIDOCAINE HYDROCHLORIDE 20 MG/ML
INJECTION, SOLUTION INFILTRATION; PERINEURAL AS NEEDED
Status: DISCONTINUED | OUTPATIENT
Start: 2022-05-11 | End: 2022-05-11 | Stop reason: SURG

## 2022-05-11 RX ADMIN — SODIUM CHLORIDE, POTASSIUM CHLORIDE, SODIUM LACTATE AND CALCIUM CHLORIDE 30 ML/HR: 600; 310; 30; 20 INJECTION, SOLUTION INTRAVENOUS at 08:56

## 2022-05-11 RX ADMIN — PROPOFOL 300 MG: 10 INJECTION, EMULSION INTRAVENOUS at 09:02

## 2022-05-11 RX ADMIN — LIDOCAINE HYDROCHLORIDE 100 MG: 20 INJECTION, SOLUTION INFILTRATION; PERINEURAL at 09:02

## 2022-05-11 RX ADMIN — GLYCOPYRROLATE 0.3 MG: 0.2 INJECTION INTRAMUSCULAR; INTRAVENOUS at 08:59

## 2022-05-11 NOTE — DISCHARGE INSTRUCTIONS
For the next 24 hours patient needs to be with a responsible adult.    For 24 hours DO NOT drive, operate machinery, appliances, drink alcohol, make important decisions or sign legal documents.    Start with a light or bland diet if you are feeling sick to your stomach otherwise advance to regular diet as tolerated.    Follow recommendations on procedure report if provided by your doctor.    Call Dr Odonnell for problems . Problems may include but not limited to: large amounts of bleeding, trouble breathing, repeated vomiting, severe unrelieved pain, fever or chills.

## 2022-05-11 NOTE — H&P
[facility]   HISTORY AND PHYSICAL    Patient Name: Al Peña  : 1970  MRN: [id number]  Primary Care Physician:  Provider, No Known  Date of admission: 2022    Subjective   Subjective     Chief Complaint: dysphagia     History of Present Illness  For solid foods, improved with prior dilation   Review of Systems   HENT: Positive for trouble swallowing.    All other systems reviewed and are negative.       Personal History     Past Medical History:   Diagnosis Date   • Anesthesia complication     ET TUBE CUFF WASN'T FULLY DEFLATED UPON REMOVAL AFTER GALLBLADDER SURGERY.  REQUIRED HOSPITALIZATION X1 WEEK AFTERWARDS   • Anxiety    • Diabetes mellitus (HCC)    • Disease of thyroid gland    • Elevated triglycerides with high cholesterol    • Fatty liver    • Hard to intubate     WITH GALLBLADDER SURGERY   • Heart palpitations    • History of chemotherapy    • History of radiation therapy    • On home oxygen therapy     3L NC AT NIGHT   • Rectal cancer (HCC)    • Shingles     RECURRING, AT LEAST 8 TIMES   • Slow gastric motility    • Slow to wake up after anesthesia        Past Surgical History:   Procedure Laterality Date   • AXILLARY LYMPH NODE BIOPSY/EXCISION Left    • CHOLECYSTECTOMY     • COLONOSCOPY N/A 2016    Procedure: COLONOSCOPY TO CECUM;  Surgeon: Georges Gusman MD;  Location: [facility] ENDOSCOPY;  Service:    • COLONOSCOPY N/A 12/15/2021    Procedure: COLONOSCOPY INTO CECUM AND TI WITH BIOPSIES;  Surgeon: Celso Odonnell MD;  Location: [facility] ENDOSCOPY;  Service: Gastroenterology;  Laterality: N/A;  PRE: FECAL INCONTINENCE  POST: DECREASED VASCULARITY   • ENDOSCOPY N/A 2016    Procedure: ESOPHAGOGASTRODUODENOSCOPY WITH COLD BIOPSIES;  Surgeon: Georges Gusman MD;  Location: [facility] ENDOSCOPY;  Service:    • ENDOSCOPY N/A 12/15/2021    Procedure: ESOPHAGOGASTRODUODENOSCOPY WITH BIOPSIES, 54FR LEZAMA DILATATION;  Surgeon: Celso Odonnell MD;  Location: [facility]  ENDOSCOPY;  Service: Gastroenterology;  Laterality: N/A;  PRE: DYSPHAGIA  POST: GASTRITIS, IRREGULAR Z LINE, ESOPHAGEAL RING   • RECTAL SURGERY     • TONSILLECTOMY     • TOOTH EXTRACTION  10/15/2018   • VENOUS ACCESS DEVICE (PORT) INSERTION AND REMOVAL         Family History: family history includes Colon cancer in his maternal grandfather. Otherwise pertinent FHx was reviewed and not pertinent to current issue.    Social History:  reports that he has never smoked. He has never used smokeless tobacco. He reports that he does not drink alcohol and does not use drugs.    Home Medications:  ALPRAZolam, Bictegravir-Emtricitab-Tenofov, Dulaglutide, HYDROcodone-acetaminophen, acyclovir, albuterol, albuterol sulfate HFA, budesonide-formoterol, fluocinonide, hydrocortisone, linaclotide, ondansetron ODT, oxymetazoline, and pantoprazole    Allergies:  Allergies   Allergen Reactions   • Oxycodone-Acetaminophen Itching   • Promethazine Itching     swelling   • Sulfa Antibiotics Anaphylaxis     Throat closes   • Neomycin Other (See Comments)     SKIN COMES OFF       Objective    Objective     Vitals:   Temp:  [98.4 °F (36.9 °C)] 98.4 °F (36.9 °C)  Heart Rate:  [85] 85  Resp:  [16] 16  BP: (122)/(73) 122/73    Physical Exam  HENT:      Right Ear: External ear normal.      Left Ear: External ear normal.      Mouth/Throat:      Pharynx: Oropharynx is clear.   Eyes:      Conjunctiva/sclera: Conjunctivae normal.   Cardiovascular:      Rate and Rhythm: Normal rate.      Pulses: Normal pulses.   Pulmonary:      Effort: Pulmonary effort is normal.   Abdominal:      General: Abdomen is flat.   Skin:     General: Skin is warm and dry.   Neurological:      General: No focal deficit present.      Mental Status: He is alert.   Psychiatric:         Mood and Affect: Mood normal.         Result Review    Result Review:  I have personally reviewed the results from the time of this admission to 5/11/2022 08:59 EDT and agree with these  findings:  []  Laboratory  []  Microbiology  []  Radiology  []  EKG/Telemetry   []  Cardiology/Vascular   []  Pathology  []  Old records  []  Other:    Assessment & Plan   Assessment / Plan     Brief Patient Summary:  Al Peña is a 52 y.o. male   Solid food dysphagia    Active Hospital Problems:  There are no active hospital problems to display for this patient.    Plan:Upper tract endoscopy, risks, alternatives and benefits discussed with patient and patient is agreeable to proceed       DVT prophylaxis:  No DVT prophylaxis order currently exists.    CODE STATUS:       Admission Status:  I believe this patient meets outpatient status.    Celso Odonnell MD

## 2022-05-11 NOTE — ANESTHESIA POSTPROCEDURE EVALUATION
"Patient: Al Peña    Procedure Summary     Date: 05/11/22 Room / Location:  [facility] /  [facility]    Anesthesia Start: 0857 Anesthesia Stop: 0914    Procedure: ESOPHAGOGASTRODUODENOSCOPY WITH LEZAMA DILATATION 54&56 (N/A Esophagus) Diagnosis:     Surgeons: Celso Odonnell MD Provider: Pavel Saini MD    Anesthesia Type: MAC ASA Status: 3          Anesthesia Type: MAC    Vitals  Vitals Value Taken Time   /74 05/11/22 0931   Temp     Pulse 84 05/11/22 0921   Resp 78 05/11/22 0931   SpO2 96 % 05/11/22 0931           Post Anesthesia Care and Evaluation    Patient location during evaluation: bedside  Patient participation: complete - patient participated  Level of consciousness: awake and alert  Pain management: adequate  Airway patency: patent  Anesthetic complications: No anesthetic complications    Cardiovascular status: acceptable  Respiratory status: acceptable  Hydration status: acceptable    Comments: /74 (BP Location: Left arm, Patient Position: Lying)   Pulse 84   Temp 36.9 °C (98.4 °F) (Oral)   Resp (!) 78   Ht 182.9 cm (72\")   Wt (!) 162 kg (358 lb)   SpO2 96%   BMI 48.55 kg/m²       "

## 2023-01-18 ENCOUNTER — TELEHEALTH PROVIDED IN PATIENT'S HOME (OUTPATIENT)
Dept: URBAN - METROPOLITAN AREA TELEHEALTH 5 | Facility: TELEHEALTH | Age: 53
End: 2023-01-18
Payer: COMMERCIAL

## 2023-01-18 DIAGNOSIS — K21.9 GASTRO-ESOPHAGEAL REFLUX DISEASE WITHOUT ESOPHAGITIS: ICD-10-CM

## 2023-01-18 DIAGNOSIS — K59.00 CONSTIPATION, UNSPECIFIED: ICD-10-CM

## 2023-01-18 PROCEDURE — 99214 OFFICE O/P EST MOD 30 MIN: CPT | Performed by: NURSE PRACTITIONER

## 2023-01-18 RX ORDER — LINACLOTIDE 290 UG/1
CAPSULE, GELATIN COATED ORAL
Qty: 90 | Refills: 3 | Status: ACTIVE
Start: 2021-12-28

## 2023-06-06 ENCOUNTER — HOSPITAL ENCOUNTER (EMERGENCY)
Facility: HOSPITAL | Age: 53
Discharge: HOME | End: 2023-06-06
Attending: EMERGENCY MEDICINE | Admitting: EMERGENCY MEDICINE
Payer: COMMERCIAL

## 2023-06-06 VITALS
HEART RATE: 93 BPM | HEIGHT: 72 IN | SYSTOLIC BLOOD PRESSURE: 122 MMHG | OXYGEN SATURATION: 96 % | WEIGHT: 315 LBS | BODY MASS INDEX: 42.66 KG/M2 | RESPIRATION RATE: 20 BRPM | DIASTOLIC BLOOD PRESSURE: 79 MMHG | TEMPERATURE: 97.9 F

## 2023-06-06 DIAGNOSIS — R21 RASH: Primary | ICD-10-CM

## 2023-06-06 LAB
ALBUMIN SERPL-MCNC: 4.5 G/DL (ref 3.5–5.2)
ALBUMIN/GLOB SERPL: 1.3 G/DL
ALP SERPL-CCNC: 91 U/L (ref 39–117)
ALT SERPL W P-5'-P-CCNC: 35 U/L (ref 1–41)
ANION GAP SERPL CALCULATED.3IONS-SCNC: 9 MMOL/L (ref 5–15)
AST SERPL-CCNC: 27 U/L (ref 1–40)
BASOPHILS # BLD AUTO: 0.01 10*3/MM3 (ref 0–0.2)
BASOPHILS NFR BLD AUTO: 0.2 % (ref 0–1.5)
BILIRUB SERPL-MCNC: 0.7 MG/DL (ref 0–1.2)
BUN SERPL-MCNC: 13 MG/DL (ref 6–20)
BUN/CREAT SERPL: 12.6 (ref 7–25)
CALCIUM SPEC-SCNC: 9.9 MG/DL (ref 8.6–10.5)
CHLORIDE SERPL-SCNC: 102 MMOL/L (ref 98–107)
CO2 SERPL-SCNC: 25 MMOL/L (ref 22–29)
CREAT SERPL-MCNC: 1.03 MG/DL (ref 0.76–1.27)
DEPRECATED RDW RBC AUTO: 44.6 FL (ref 37–54)
EGFRCR SERPLBLD CKD-EPI 2021: 87.4 ML/MIN/1.73
EOSINOPHIL # BLD AUTO: 0.3 10*3/MM3 (ref 0–0.4)
EOSINOPHIL NFR BLD AUTO: 4.7 % (ref 0.3–6.2)
ERYTHROCYTE [DISTWIDTH] IN BLOOD BY AUTOMATED COUNT: 14.1 % (ref 12.3–15.4)
GLOBULIN UR ELPH-MCNC: 3.6 GM/DL
GLUCOSE SERPL-MCNC: 86 MG/DL (ref 65–99)
HCT VFR BLD AUTO: 53.4 % (ref 37.5–51)
HGB BLD-MCNC: 17.9 G/DL (ref 13–17.7)
IMM GRANULOCYTES # BLD AUTO: 0.01 10*3/MM3 (ref 0–0.05)
IMM GRANULOCYTES NFR BLD AUTO: 0.2 % (ref 0–0.5)
LYMPHOCYTES # BLD AUTO: 1.67 10*3/MM3 (ref 0.7–3.1)
LYMPHOCYTES NFR BLD AUTO: 26.3 % (ref 19.6–45.3)
MCH RBC QN AUTO: 29.2 PG (ref 26.6–33)
MCHC RBC AUTO-ENTMCNC: 33.5 G/DL (ref 31.5–35.7)
MCV RBC AUTO: 87 FL (ref 79–97)
MONOCYTES # BLD AUTO: 0.42 10*3/MM3 (ref 0.1–0.9)
MONOCYTES NFR BLD AUTO: 6.6 % (ref 5–12)
NEUTROPHILS NFR BLD AUTO: 3.94 10*3/MM3 (ref 1.7–7)
NEUTROPHILS NFR BLD AUTO: 62 % (ref 42.7–76)
PLATELET # BLD AUTO: 183 10*3/MM3 (ref 140–450)
PMV BLD AUTO: 8.7 FL (ref 6–12)
POTASSIUM SERPL-SCNC: 3.8 MMOL/L (ref 3.5–5.2)
PROT SERPL-MCNC: 8.1 G/DL (ref 6–8.5)
RBC # BLD AUTO: 6.14 10*6/MM3 (ref 4.14–5.8)
SODIUM SERPL-SCNC: 136 MMOL/L (ref 136–145)
WBC NRBC COR # BLD: 6.35 10*3/MM3 (ref 3.4–10.8)

## 2023-06-06 PROCEDURE — 85025 COMPLETE CBC W/AUTO DIFF WBC: CPT | Performed by: EMERGENCY MEDICINE

## 2023-06-06 PROCEDURE — 80053 COMPREHEN METABOLIC PANEL: CPT | Performed by: EMERGENCY MEDICINE

## 2023-06-06 PROCEDURE — 25010000002 DIPHENHYDRAMINE PER 50 MG: Performed by: EMERGENCY MEDICINE

## 2023-06-06 PROCEDURE — 99283 EMERGENCY DEPT VISIT LOW MDM: CPT

## 2023-06-06 PROCEDURE — 96374 THER/PROPH/DIAG INJ IV PUSH: CPT

## 2023-06-06 PROCEDURE — 25010000002 METHYLPREDNISOLONE PER 125 MG: Performed by: EMERGENCY MEDICINE

## 2023-06-06 PROCEDURE — 96375 TX/PRO/DX INJ NEW DRUG ADDON: CPT

## 2023-06-06 RX ORDER — METHYLPREDNISOLONE SODIUM SUCCINATE 125 MG/2ML
80 INJECTION, POWDER, LYOPHILIZED, FOR SOLUTION INTRAMUSCULAR; INTRAVENOUS ONCE
Status: COMPLETED | OUTPATIENT
Start: 2023-06-06 | End: 2023-06-06

## 2023-06-06 RX ORDER — FAMOTIDINE 10 MG/ML
20 INJECTION, SOLUTION INTRAVENOUS ONCE
Status: COMPLETED | OUTPATIENT
Start: 2023-06-06 | End: 2023-06-06

## 2023-06-06 RX ORDER — DIPHENHYDRAMINE HYDROCHLORIDE 50 MG/ML
25 INJECTION INTRAMUSCULAR; INTRAVENOUS ONCE
Status: COMPLETED | OUTPATIENT
Start: 2023-06-06 | End: 2023-06-06

## 2023-06-06 RX ORDER — SODIUM CHLORIDE 0.9 % (FLUSH) 0.9 %
10 SYRINGE (ML) INJECTION AS NEEDED
Status: DISCONTINUED | OUTPATIENT
Start: 2023-06-06 | End: 2023-06-06 | Stop reason: HOSPADM

## 2023-06-06 RX ORDER — DIPHENHYDRAMINE HCL 25 MG
25 CAPSULE ORAL EVERY 6 HOURS PRN
Qty: 30 CAPSULE | Refills: 0 | Status: SHIPPED | OUTPATIENT
Start: 2023-06-06

## 2023-06-06 RX ORDER — PREDNISONE 20 MG/1
TABLET ORAL
Qty: 6 TABLET | Refills: 0 | Status: SHIPPED | OUTPATIENT
Start: 2023-06-07 | End: 2023-06-11

## 2023-06-06 RX ADMIN — FAMOTIDINE 20 MG: 10 INJECTION INTRAVENOUS at 19:58

## 2023-06-06 RX ADMIN — METHYLPREDNISOLONE SODIUM SUCCINATE 80 MG: 125 INJECTION, POWDER, FOR SOLUTION INTRAMUSCULAR; INTRAVENOUS at 19:57

## 2023-06-06 RX ADMIN — DIPHENHYDRAMINE HYDROCHLORIDE 25 MG: 50 INJECTION, SOLUTION INTRAMUSCULAR; INTRAVENOUS at 19:58

## 2023-06-06 NOTE — FSED PROVIDER NOTE
"Subjective   History of Present Illness  53-year-old male presents complaining of facial redness and swelling.  Patient reports he has had some redness \"for a while\" but noticed that it worsened and became swollen earlier while he was out in the sun.  He feels like it is improved some now sitting down in the room.  He denies skin sloughing or mucous membrane involvement.  No difficulty breathing or swallowing.  Patient does report recently starting thyroid medication and that he also is now on Mounjaro for diabetes.  No other new meds or other known exposures.  Patient is HIV positive and reports compliance with his HAART therapy.  He has never had a reaction like this before.    History provided by:  Patient   used: No      Review of Systems   Constitutional: Negative.  Negative for fever.   HENT:  Positive for facial swelling. Negative for dental problem and trouble swallowing.    Respiratory: Negative.  Negative for shortness of breath.    Cardiovascular: Negative.  Negative for chest pain.   Gastrointestinal: Negative.  Negative for abdominal pain and vomiting.   Genitourinary: Negative.  Negative for dysuria.   Skin:  Positive for rash.   All other systems reviewed and are negative.    Past Medical History:   Diagnosis Date    Anesthesia complication     ET TUBE CUFF WASN'T FULLY DEFLATED UPON REMOVAL AFTER GALLBLADDER SURGERY.  REQUIRED HOSPITALIZATION X1 WEEK AFTERWARDS    Anxiety     Diabetes mellitus     Disease of thyroid gland     Elevated triglycerides with high cholesterol     Fatty liver     Hard to intubate     WITH GALLBLADDER SURGERY    Heart palpitations     History of chemotherapy 2019    History of radiation therapy     On home oxygen therapy     3L NC AT NIGHT    Rectal cancer     Shingles     RECURRING, AT LEAST 8 TIMES    Slow gastric motility     Slow to wake up after anesthesia        Allergies   Allergen Reactions    Oxycodone-Acetaminophen Itching    Promethazine " Itching     swelling    Sulfa Antibiotics Anaphylaxis     Throat closes    Neomycin Other (See Comments)     SKIN COMES OFF       Past Surgical History:   Procedure Laterality Date    AXILLARY LYMPH NODE BIOPSY/EXCISION Left     CHOLECYSTECTOMY  2002    COLONOSCOPY N/A 07/08/2016    Procedure: COLONOSCOPY TO CECUM;  Surgeon: Georges Gusman MD;  Location: [facility] ENDOSCOPY;  Service:     COLONOSCOPY N/A 12/15/2021    Procedure: COLONOSCOPY INTO CECUM AND TI WITH BIOPSIES;  Surgeon: Celso Odonnell MD;  [facility] END[facility];  Service: Gastroenterology;  Laterality: N/A;  PRE: FECAL INCONTINENCE  POST: DECREASED VASCULARITY    ENDOSCOPY N/A 07/08/2016    Procedure: ESOPHAGOGASTRODUODENOSCOPY WITH COLD BIOPSIES;  Surgeon: Georges Gusman MD;  [facility] END[facility];  Service:     ENDOSCOPY N/A 12/15/2021    Procedure: ESOPHAGOGASTRODUODENOSCOPY WITH BIOPSIES, 54FR LEZAMA DILATATION;  Surgeon: Celso Odonnell MD;  Location: [facility] ENDOSCOPY;  Service: Gastroenterology;  Laterality: N/A;  PRE: DYSPHAGIA  POST: GASTRITIS, IRREGULAR Z LINE, ESOPHAGEAL RING    ENDOSCOPY N/A 5/11/2022    Procedure: ESOPHAGOGASTRODUODENOSCOPY WITH LEZAMA DILATATION 54&56;  Surgeon: Celso Odonnell MD;  Location: [facility] ENDOSCOPY;  Service: Gastroenterology;  Laterality: N/A;  DYSPHAGIA  POST:ESOPHAGEAL RING; ESOPHAGITIS; GASTRITIS; GASTROPARESIS    RECTAL SURGERY      TONSILLECTOMY      TOOTH EXTRACTION  10/15/2018    VENOUS ACCESS DEVICE (PORT) INSERTION AND REMOVAL         Family History   Problem Relation Age of Onset    Colon cancer Maternal Grandfather     Malig Hyperthermia Neg Hx        Social History     Socioeconomic History    Marital status:    Tobacco Use    Smoking status: Never    Smokeless tobacco: Never   Vaping Use    Vaping Use: Never used   Substance and Sexual Activity    Alcohol use: No    Drug use: No    Sexual activity: Defer           Objective   Physical Exam  Vitals and nursing note reviewed.    Constitutional:       General: He is not in acute distress.     Appearance: He is not diaphoretic.   HENT:      Head: Normocephalic and atraumatic.      Comments: Mild facial erythema, scattered pimples/pustules, mild swelling on R, no sloughing or MM involvement, normal dentition, normal oropharynx     Right Ear: External ear normal.      Left Ear: External ear normal.      Nose: Nose normal.      Mouth/Throat:      Mouth: Mucous membranes are moist.   Eyes:      Pupils: Pupils are equal, round, and reactive to light.   Cardiovascular:      Rate and Rhythm: Normal rate and regular rhythm.      Heart sounds: Normal heart sounds.   Pulmonary:      Effort: Pulmonary effort is normal. No respiratory distress.      Breath sounds: Normal breath sounds. No stridor.   Abdominal:      Palpations: Abdomen is soft.      Tenderness: There is no abdominal tenderness.   Musculoskeletal:         General: No swelling or tenderness. Normal range of motion.      Cervical back: Normal range of motion and neck supple.   Skin:     General: Skin is warm and dry.      Findings: No rash.   Neurological:      General: No focal deficit present.      Mental Status: He is alert and oriented to person, place, and time.   Psychiatric:         Mood and Affect: Mood normal.         Behavior: Behavior normal.       Procedures           ED Course                                           Medical Decision Making  53-year-old male presenting with symptoms as described.  Differential includes allergic reaction, contact dermatitis, angioedema.  It is unclear if this might be related to a recent medication or environmental exposure.  He has no evidence for anaphylaxis and no signs of any airway involvement or other red flags.  His symptoms have nearly resolved after steroids and Benadryl.  We will continue supportive care and have him follow-up closely for a recheck.    Problems Addressed:  Rash: complicated acute illness or injury    Amount and/or  Complexity of Data Reviewed  Labs: ordered.    Risk  Prescription drug management.        Final diagnoses:   Rash       ED Disposition  ED Disposition       ED Disposition   Discharge    Condition   Stable    Comment   --               Provider, No Known  [place] [id number] [phone]    Schedule an appointment as soon as possible for a visit            Medication List        New Prescriptions      diphenhydrAMINE 25 mg capsule  Commonly known as: BENADRYL  Take 1 capsule by mouth Every 6 (Six) Hours As Needed for Itching or Allergies.     predniSONE 20 MG tablet  Commonly known as: DELTASONE  Take 2 tablets by mouth Daily for 2 days, THEN 1 tablet Daily for 2 days.  Start taking on: June 7, 2023            Changed      acyclovir 800 MG tablet  Commonly known as: ZOVIRAX  TAKE ONE TABLET BY MOUTH THREE TIMES A DAY  What changed:   how much to take  how to take this  when to take this  reasons to take this               Where to Get Your Medications        These medications were sent to [facility] [id number] - [facility] [address] [facility] [address] AT [facility] [phone] [facility] [phone] [address], [place] [id number]      Phone: [phone]   diphenhydrAMINE 25 mg capsule  predniSONE 20 MG tablet

## 2023-12-08 ENCOUNTER — HOSPITAL ENCOUNTER (EMERGENCY)
Facility: HOSPITAL | Age: 53
Discharge: HOME | End: 2023-12-08
Attending: EMERGENCY MEDICINE
Payer: COMMERCIAL

## 2023-12-08 ENCOUNTER — APPOINTMENT (OUTPATIENT)
Dept: CT IMAGING | Facility: HOSPITAL | Age: 53
End: 2023-12-08
Payer: COMMERCIAL

## 2023-12-08 VITALS
DIASTOLIC BLOOD PRESSURE: 97 MMHG | OXYGEN SATURATION: 95 % | TEMPERATURE: 98 F | HEIGHT: 72 IN | RESPIRATION RATE: 18 BRPM | HEART RATE: 78 BPM | SYSTOLIC BLOOD PRESSURE: 135 MMHG | BODY MASS INDEX: 40.63 KG/M2 | WEIGHT: 300 LBS

## 2023-12-08 DIAGNOSIS — R19.7 DIARRHEA, UNSPECIFIED TYPE: Primary | ICD-10-CM

## 2023-12-08 LAB
ALBUMIN SERPL-MCNC: 3.7 G/DL (ref 3.5–5.2)
ALBUMIN/GLOB SERPL: 1.5 G/DL
ALP SERPL-CCNC: 66 U/L (ref 39–117)
ALT SERPL W P-5'-P-CCNC: 70 U/L (ref 1–41)
ANION GAP SERPL CALCULATED.3IONS-SCNC: 7.9 MMOL/L (ref 5–15)
AST SERPL-CCNC: 33 U/L (ref 1–40)
BASOPHILS # BLD AUTO: 0.02 10*3/MM3 (ref 0–0.2)
BASOPHILS NFR BLD AUTO: 0.3 % (ref 0–1.5)
BILIRUB SERPL-MCNC: 1 MG/DL (ref 0–1.2)
BILIRUB UR QL STRIP: NEGATIVE
BUN SERPL-MCNC: 17 MG/DL (ref 6–20)
BUN/CREAT SERPL: 14.8 (ref 7–25)
CALCIUM SPEC-SCNC: 9.2 MG/DL (ref 8.6–10.5)
CHLORIDE SERPL-SCNC: 110 MMOL/L (ref 98–107)
CLARITY UR: CLEAR
CO2 SERPL-SCNC: 19.1 MMOL/L (ref 22–29)
COLOR UR: YELLOW
CREAT SERPL-MCNC: 1.15 MG/DL (ref 0.76–1.27)
DEPRECATED RDW RBC AUTO: 43.8 FL (ref 37–54)
EGFRCR SERPLBLD CKD-EPI 2021: 76.6 ML/MIN/1.73
EOSINOPHIL # BLD AUTO: 0.39 10*3/MM3 (ref 0–0.4)
EOSINOPHIL NFR BLD AUTO: 5 % (ref 0.3–6.2)
ERYTHROCYTE [DISTWIDTH] IN BLOOD BY AUTOMATED COUNT: 14.5 % (ref 12.3–15.4)
GLOBULIN UR ELPH-MCNC: 2.4 GM/DL
GLUCOSE SERPL-MCNC: 86 MG/DL (ref 65–99)
GLUCOSE UR STRIP-MCNC: NEGATIVE MG/DL
HCT VFR BLD AUTO: 58.7 % (ref 37.5–51)
HGB BLD-MCNC: 19.2 G/DL (ref 13–17.7)
HGB UR QL STRIP.AUTO: NEGATIVE
IMM GRANULOCYTES # BLD AUTO: 0.01 10*3/MM3 (ref 0–0.05)
IMM GRANULOCYTES NFR BLD AUTO: 0.1 % (ref 0–0.5)
KETONES UR QL STRIP: NEGATIVE
LEUKOCYTE ESTERASE UR QL STRIP.AUTO: NEGATIVE
LIPASE SERPL-CCNC: 39 U/L (ref 13–60)
LYMPHOCYTES # BLD AUTO: 2.09 10*3/MM3 (ref 0.7–3.1)
LYMPHOCYTES NFR BLD AUTO: 26.7 % (ref 19.6–45.3)
MCH RBC QN AUTO: 28.3 PG (ref 26.6–33)
MCHC RBC AUTO-ENTMCNC: 32.7 G/DL (ref 31.5–35.7)
MCV RBC AUTO: 86.6 FL (ref 79–97)
MONOCYTES # BLD AUTO: 0.55 10*3/MM3 (ref 0.1–0.9)
MONOCYTES NFR BLD AUTO: 7 % (ref 5–12)
NEUTROPHILS NFR BLD AUTO: 4.76 10*3/MM3 (ref 1.7–7)
NEUTROPHILS NFR BLD AUTO: 60.9 % (ref 42.7–76)
NITRITE UR QL STRIP: NEGATIVE
PH UR STRIP.AUTO: <=5 [PH] (ref 5–8)
PLATELET # BLD AUTO: 171 10*3/MM3 (ref 140–450)
PMV BLD AUTO: 9.6 FL (ref 6–12)
POTASSIUM SERPL-SCNC: 3.6 MMOL/L (ref 3.5–5.2)
PROT SERPL-MCNC: 6.1 G/DL (ref 6–8.5)
PROT UR QL STRIP: NEGATIVE
RBC # BLD AUTO: 6.78 10*6/MM3 (ref 4.14–5.8)
SODIUM SERPL-SCNC: 137 MMOL/L (ref 136–145)
SP GR UR STRIP: <=1.005 (ref 1–1.03)
UROBILINOGEN UR QL STRIP: NORMAL
WBC NRBC COR # BLD AUTO: 7.82 10*3/MM3 (ref 3.4–10.8)

## 2023-12-08 PROCEDURE — 96361 HYDRATE IV INFUSION ADD-ON: CPT

## 2023-12-08 PROCEDURE — 25010000002 ONDANSETRON PER 1 MG

## 2023-12-08 PROCEDURE — 83690 ASSAY OF LIPASE: CPT | Performed by: NURSE PRACTITIONER

## 2023-12-08 PROCEDURE — 96374 THER/PROPH/DIAG INJ IV PUSH: CPT

## 2023-12-08 PROCEDURE — 74177 CT ABD & PELVIS W/CONTRAST: CPT

## 2023-12-08 PROCEDURE — 25810000003 SODIUM CHLORIDE 0.9 % SOLUTION: Performed by: NURSE PRACTITIONER

## 2023-12-08 PROCEDURE — 80053 COMPREHEN METABOLIC PANEL: CPT | Performed by: NURSE PRACTITIONER

## 2023-12-08 PROCEDURE — 25510000001 IOPAMIDOL PER 1 ML: Performed by: EMERGENCY MEDICINE

## 2023-12-08 PROCEDURE — 99285 EMERGENCY DEPT VISIT HI MDM: CPT

## 2023-12-08 PROCEDURE — 99283 EMERGENCY DEPT VISIT LOW MDM: CPT | Performed by: NURSE PRACTITIONER

## 2023-12-08 PROCEDURE — 81003 URINALYSIS AUTO W/O SCOPE: CPT | Performed by: NURSE PRACTITIONER

## 2023-12-08 PROCEDURE — 85025 COMPLETE CBC W/AUTO DIFF WBC: CPT | Performed by: NURSE PRACTITIONER

## 2023-12-08 RX ORDER — SODIUM CHLORIDE 0.9 % (FLUSH) 0.9 %
10 SYRINGE (ML) INJECTION AS NEEDED
Status: DISCONTINUED | OUTPATIENT
Start: 2023-12-08 | End: 2023-12-08 | Stop reason: HOSPADM

## 2023-12-08 RX ORDER — ONDANSETRON 2 MG/ML
4 INJECTION INTRAMUSCULAR; INTRAVENOUS ONCE
Status: COMPLETED | OUTPATIENT
Start: 2023-12-08 | End: 2023-12-08

## 2023-12-08 RX ORDER — ONDANSETRON 2 MG/ML
INJECTION INTRAMUSCULAR; INTRAVENOUS
Status: COMPLETED
Start: 2023-12-08 | End: 2023-12-08

## 2023-12-08 RX ORDER — ONDANSETRON 4 MG/1
4 TABLET, ORALLY DISINTEGRATING ORAL EVERY 6 HOURS PRN
Qty: 15 TABLET | Refills: 0 | Status: SHIPPED | OUTPATIENT
Start: 2023-12-08

## 2023-12-08 RX ORDER — DICYCLOMINE HCL 20 MG
20 TABLET ORAL EVERY 6 HOURS PRN
Qty: 15 TABLET | Refills: 0 | Status: SHIPPED | OUTPATIENT
Start: 2023-12-08

## 2023-12-08 RX ADMIN — SODIUM CHLORIDE 1000 ML: 9 INJECTION, SOLUTION INTRAVENOUS at 09:32

## 2023-12-08 RX ADMIN — ONDANSETRON 4 MG: 2 INJECTION INTRAMUSCULAR; INTRAVENOUS at 10:55

## 2023-12-08 RX ADMIN — IOPAMIDOL 150 ML: 755 INJECTION, SOLUTION INTRAVENOUS at 10:05

## 2023-12-08 NOTE — FSED PROVIDER NOTE
Subjective   History of Present Illness  Patient is a 53-year-old male who presents complaining of diarrhea and some abdominal pain for the last 5 days.  He also complains of a rash to his bilateral hands arms and legs.  States he did use a new lotion but only on his arms and hands not on his legs.  He denies any fever, chills.  Denies any nausea, vomiting.      Review of Systems   Gastrointestinal:  Positive for abdominal pain.   Skin:  Positive for rash.   All other systems reviewed and are negative.      Past Medical History:   Diagnosis Date    Anesthesia complication     ET TUBE CUFF WASN'T FULLY DEFLATED UPON REMOVAL AFTER GALLBLADDER SURGERY.  REQUIRED HOSPITALIZATION X1 WEEK AFTERWARDS    Anxiety     Diabetes mellitus     Disease of thyroid gland     Elevated triglycerides with high cholesterol     Fatty liver     Hard to intubate     WITH GALLBLADDER SURGERY    Heart palpitations     History of chemotherapy 2019    History of radiation therapy     On home oxygen therapy     3L NC AT NIGHT    Rectal cancer     Shingles     RECURRING, AT LEAST 8 TIMES    Slow gastric motility     Slow to wake up after anesthesia        Allergies   Allergen Reactions    Oxycodone-Acetaminophen Itching    Promethazine Itching     swelling    Sulfa Antibiotics Anaphylaxis     Throat closes    Neomycin Other (See Comments)     SKIN COMES OFF       Past Surgical History:   Procedure Laterality Date    AXILLARY LYMPH NODE BIOPSY/EXCISION Left     CHOLECYSTECTOMY  2002    COLONOSCOPY N/A 07/08/2016    Procedure: COLONOSCOPY TO CECUM;  Surgeon: Georges Gusman MD;  Location: [facility] ENDOSCOPY;  Service:     COLONOSCOPY N/A 12/15/2021    Procedure: COLONOSCOPY INTO CECUM AND TI WITH BIOPSIES;  Surgeon: Celso Odonnell MD;  Location: [facility] ENDOSCOPY;  Service: Gastroenterology;  Laterality: N/A;  PRE: FECAL INCONTINENCE  POST: DECREASED VASCULARITY    ENDOSCOPY N/A 07/08/2016    Procedure: ESOPHAGOGASTRODUODENOSCOPY WITH COLD  BIOPSIES;  Surgeon: Georges Gusman MD;  Location: [facility] ENDOSCOPY;  Service:     ENDOSCOPY N/A 12/15/2021    Procedure: ESOPHAGOGASTRODUODENOSCOPY WITH BIOPSIES, 54FR LEZAMA DILATATION;  Surgeon: Celso Odonnell MD;  Location: [facility] ENDOSCOPY;  Service: Gastroenterology;  Laterality: N/A;  PRE: DYSPHAGIA  POST: GASTRITIS, IRREGULAR Z LINE, ESOPHAGEAL RING    ENDOSCOPY N/A 5/11/2022    Procedure: ESOPHAGOGASTRODUODENOSCOPY WITH LEZAMA DILATATION 54&56;  Surgeon: Celso Odonnell MD;  Location: [facility] ENDOSCOPY;  Service: Gastroenterology;  Laterality: N/A;  DYSPHAGIA  POST:ESOPHAGEAL RING; ESOPHAGITIS; GASTRITIS; GASTROPARESIS    RECTAL SURGERY      TONSILLECTOMY      TOOTH EXTRACTION  10/15/2018    VENOUS ACCESS DEVICE (PORT) INSERTION AND REMOVAL         Family History   Problem Relation Age of Onset    Colon cancer Maternal Grandfather     Malig Hyperthermia Neg Hx        Social History     Socioeconomic History    Marital status:    Tobacco Use    Smoking status: Never    Smokeless tobacco: Never   Vaping Use    Vaping Use: Never used   Substance and Sexual Activity    Alcohol use: No    Drug use: No    Sexual activity: Defer           Objective   Physical Exam  Vitals and nursing note reviewed.   Constitutional:       Appearance: Normal appearance.   HENT:      Head: Normocephalic and atraumatic.   Pulmonary:      Effort: Pulmonary effort is normal.      Breath sounds: Normal breath sounds.   Abdominal:      General: Abdomen is flat.      Palpations: Abdomen is soft.      Tenderness: There is abdominal tenderness (Mild, left-sided).   Musculoskeletal:      Cervical back: Normal range of motion and neck supple.   Skin:     General: Skin is warm and dry.      Capillary Refill: Capillary refill takes less than 2 seconds.      Findings: Rash (Bilateral hands) present.   Neurological:      General: No focal deficit present.      Mental Status: He is alert and oriented to person, place, and time.          Procedures           ED Course                                           Medical Decision Making  CT negative for acute findings, laboratory findings reassuring.  Negative UA.  States significant other recently started with diarrhea as well, likely viral in nature.  Advised him to avoid new lotion that he was using from [employer] for his hands.  He is otherwise nontoxic and well-appearing.  He is to follow-up with GI if his symptoms do not improve, and was given strict return precautions.    Problems Addressed:  Diarrhea, unspecified type: complicated acute illness or injury    Amount and/or Complexity of Data Reviewed  Labs: ordered.  Radiology: ordered.    Risk  Prescription drug management.        Final diagnoses:   Diarrhea, unspecified type       ED Disposition  ED Disposition       ED Disposition   Discharge    Condition   Stable    Comment   --               Rene Davis MD  [address] [place] [address]  [phone]    Call   If symptoms worsen         Medication List        New Prescriptions      dicyclomine 20 MG tablet  Commonly known as: BENTYL  Take 1 tablet by mouth Every 6 (Six) Hours As Needed for Abdominal Cramping.     nystatin 100,000 unit/mL suspension  Commonly known as: MYCOSTATIN  Swish and swallow 5 mL 4 (Four) Times a Day.            Changed      acyclovir 800 MG tablet  Commonly known as: ZOVIRAX  TAKE ONE TABLET BY MOUTH THREE TIMES A DAY  What changed:   how much to take  how to take this  when to take this  reasons to take this     * ondansetron ODT 8 MG disintegrating tablet  Commonly known as: ZOFRAN-ODT  Take 1 tablet by mouth Every 6 (Six) Hours As Needed for Nausea or Vomiting.  What changed: Another medication with the same name was added. Make sure you understand how and when to take each.     * ondansetron ODT 4 MG disintegrating tablet  Commonly known as: ZOFRAN-ODT  Place 1 tablet on the tongue Every 6 (Six) Hours As Needed for  Nausea.  What changed: You were already taking a medication with the same name, and this prescription was added. Make sure you understand how and when to take each.           * This list has 2 medication(s) that are the same as other medications prescribed for you. Read the directions carefully, and ask your doctor or other care provider to review them with you.                   Where to Get Your Medications        These medications were sent to [facility] [id number] - [facility] [address] [facility] AT [facility] [phone] [facility] [phone] [address], [facility] [address]      Phone: [phone]   dicyclomine 20 MG tablet  nystatin 100,000 unit/mL suspension  ondansetron ODT 4 MG disintegrating tablet

## 2024-02-21 ENCOUNTER — OFFICE (OUTPATIENT)
Dept: URBAN - METROPOLITAN AREA CLINIC 66 | Facility: CLINIC | Age: 54
End: 2024-02-21

## 2024-02-21 VITALS
DIASTOLIC BLOOD PRESSURE: 75 MMHG | SYSTOLIC BLOOD PRESSURE: 111 MMHG | HEART RATE: 86 BPM | WEIGHT: 287 LBS | HEIGHT: 72 IN | OXYGEN SATURATION: 98 %

## 2024-02-21 DIAGNOSIS — R13.10 DYSPHAGIA, UNSPECIFIED: ICD-10-CM

## 2024-02-21 DIAGNOSIS — K59.00 CONSTIPATION, UNSPECIFIED: ICD-10-CM

## 2024-02-21 DIAGNOSIS — K21.9 GASTRO-ESOPHAGEAL REFLUX DISEASE WITHOUT ESOPHAGITIS: ICD-10-CM

## 2024-02-21 DIAGNOSIS — Z85.048 PERSONAL HISTORY OF OTHER MALIGNANT NEOPLASM OF RECTUM, RECT: ICD-10-CM

## 2024-02-21 DIAGNOSIS — R15.9 FULL INCONTINENCE OF FECES: ICD-10-CM

## 2024-02-21 PROCEDURE — 99215 OFFICE O/P EST HI 40 MIN: CPT | Performed by: NURSE PRACTITIONER

## 2024-02-21 RX ORDER — LINACLOTIDE 290 UG/1
CAPSULE, GELATIN COATED ORAL
Qty: 90 | Refills: 3 | Status: ACTIVE
Start: 2021-12-28

## 2024-02-21 RX ORDER — PANTOPRAZOLE SODIUM 40 MG/1
40 TABLET, DELAYED RELEASE ORAL
Qty: 30 | Refills: 11 | Status: ACTIVE
Start: 2024-02-21

## 2024-07-08 ENCOUNTER — APPOINTMENT (OUTPATIENT)
Dept: CARDIOLOGY | Facility: HOSPITAL | Age: 54
End: 2024-07-08
Payer: COMMERCIAL

## 2024-07-08 ENCOUNTER — APPOINTMENT (OUTPATIENT)
Dept: GENERAL RADIOLOGY | Facility: HOSPITAL | Age: 54
End: 2024-07-08
Payer: COMMERCIAL

## 2024-07-08 ENCOUNTER — HOSPITAL ENCOUNTER (OUTPATIENT)
Facility: HOSPITAL | Age: 54
Setting detail: OBSERVATION
Discharge: HOME | End: 2024-07-09
Attending: EMERGENCY MEDICINE | Admitting: EMERGENCY MEDICINE
Payer: COMMERCIAL

## 2024-07-08 DIAGNOSIS — R07.9 CHEST PAIN, UNSPECIFIED TYPE: Primary | ICD-10-CM

## 2024-07-08 DIAGNOSIS — F41.9 ANXIETY: ICD-10-CM

## 2024-07-08 LAB
ALBUMIN SERPL-MCNC: 4.4 G/DL (ref 3.5–5.2)
ALBUMIN/GLOB SERPL: 1.6 G/DL
ALP SERPL-CCNC: 69 U/L (ref 39–117)
ALT SERPL W P-5'-P-CCNC: 36 U/L (ref 1–41)
ANION GAP SERPL CALCULATED.3IONS-SCNC: 8.5 MMOL/L (ref 5–15)
ASCENDING AORTA: 3.5 CM
AST SERPL-CCNC: 23 U/L (ref 1–40)
BASOPHILS # BLD AUTO: 0.02 10*3/MM3 (ref 0–0.2)
BASOPHILS NFR BLD AUTO: 0.3 % (ref 0–1.5)
BH CV ECHO MEAS - ACS: 1.94 CM
BH CV ECHO MEAS - AO MAX PG: 8.6 MMHG
BH CV ECHO MEAS - AO MEAN PG: 4.8 MMHG
BH CV ECHO MEAS - AO ROOT DIAM: 3.1 CM
BH CV ECHO MEAS - AO V2 MAX: 146.8 CM/SEC
BH CV ECHO MEAS - AO V2 VTI: 30.8 CM
BH CV ECHO MEAS - AVA(I,D): 2.7 CM2
BH CV ECHO MEAS - EDV(CUBED): 62.2 ML
BH CV ECHO MEAS - EDV(MOD-SP2): 93 ML
BH CV ECHO MEAS - EDV(MOD-SP4): 98 ML
BH CV ECHO MEAS - EF(MOD-BP): 69.4 %
BH CV ECHO MEAS - EF(MOD-SP2): 73.1 %
BH CV ECHO MEAS - EF(MOD-SP4): 62.2 %
BH CV ECHO MEAS - ESV(CUBED): 10.5 ML
BH CV ECHO MEAS - ESV(MOD-SP2): 25 ML
BH CV ECHO MEAS - ESV(MOD-SP4): 37 ML
BH CV ECHO MEAS - FS: 44.7 %
BH CV ECHO MEAS - IVS/LVPW: 0.81 CM
BH CV ECHO MEAS - IVSD: 0.84 CM
BH CV ECHO MEAS - LAT PEAK E' VEL: 12.7 CM/SEC
BH CV ECHO MEAS - LV DIASTOLIC VOL/BSA (35-75): 38.8 CM2
BH CV ECHO MEAS - LV MASS(C)D: 115.1 GRAMS
BH CV ECHO MEAS - LV MAX PG: 8.5 MMHG
BH CV ECHO MEAS - LV MEAN PG: 3.8 MMHG
BH CV ECHO MEAS - LV SYSTOLIC VOL/BSA (12-30): 14.6 CM2
BH CV ECHO MEAS - LV V1 MAX: 145.6 CM/SEC
BH CV ECHO MEAS - LV V1 VTI: 26.4 CM
BH CV ECHO MEAS - LVIDD: 4 CM
BH CV ECHO MEAS - LVIDS: 2.19 CM
BH CV ECHO MEAS - LVOT AREA: 3.2 CM2
BH CV ECHO MEAS - LVOT DIAM: 2.01 CM
BH CV ECHO MEAS - LVPWD: 1.04 CM
BH CV ECHO MEAS - MED PEAK E' VEL: 8.6 CM/SEC
BH CV ECHO MEAS - MV A DUR: 0.13 SEC
BH CV ECHO MEAS - MV A MAX VEL: 73.9 CM/SEC
BH CV ECHO MEAS - MV DEC SLOPE: 424.8 CM/SEC2
BH CV ECHO MEAS - MV DEC TIME: 0.19 SEC
BH CV ECHO MEAS - MV E MAX VEL: 91.1 CM/SEC
BH CV ECHO MEAS - MV E/A: 1.23
BH CV ECHO MEAS - MV MAX PG: 4.7 MMHG
BH CV ECHO MEAS - MV MEAN PG: 1.5 MMHG
BH CV ECHO MEAS - MV P1/2T: 75 MSEC
BH CV ECHO MEAS - MV V2 VTI: 35.4 CM
BH CV ECHO MEAS - MVA(P1/2T): 2.9 CM2
BH CV ECHO MEAS - MVA(VTI): 2.38 CM2
BH CV ECHO MEAS - PA ACC TIME: 0.13 SEC
BH CV ECHO MEAS - PA V2 MAX: 108.8 CM/SEC
BH CV ECHO MEAS - PULM A REVS DUR: 0.11 SEC
BH CV ECHO MEAS - PULM A REVS VEL: 32.1 CM/SEC
BH CV ECHO MEAS - PULM DIAS VEL: 55.9 CM/SEC
BH CV ECHO MEAS - PULM S/D: 1.07
BH CV ECHO MEAS - PULM SYS VEL: 60.1 CM/SEC
BH CV ECHO MEAS - RAP SYSTOLE: 3 MMHG
BH CV ECHO MEAS - RV MAX PG: 3.6 MMHG
BH CV ECHO MEAS - RV V1 MAX: 94.3 CM/SEC
BH CV ECHO MEAS - RV V1 VTI: 17.3 CM
BH CV ECHO MEAS - RVSP: 30 MMHG
BH CV ECHO MEAS - SV(LVOT): 84.1 ML
BH CV ECHO MEAS - SV(MOD-SP2): 68 ML
BH CV ECHO MEAS - SV(MOD-SP4): 61 ML
BH CV ECHO MEAS - SVI(LVOT): 33.3 ML/M2
BH CV ECHO MEAS - SVI(MOD-SP2): 26.9 ML/M2
BH CV ECHO MEAS - SVI(MOD-SP4): 24.1 ML/M2
BH CV ECHO MEAS - TAPSE (>1.6): 1.86 CM
BH CV ECHO MEAS - TR MAX PG: 27.1 MMHG
BH CV ECHO MEAS - TR MAX VEL: 260.2 CM/SEC
BH CV ECHO MEASUREMENTS AVERAGE E/E' RATIO: 8.55
BH CV LOWER VASCULAR LEFT COMMON FEMORAL AUGMENT: NORMAL
BH CV LOWER VASCULAR LEFT COMMON FEMORAL COMPETENT: NORMAL
BH CV LOWER VASCULAR LEFT COMMON FEMORAL COMPRESS: NORMAL
BH CV LOWER VASCULAR LEFT COMMON FEMORAL PHASIC: NORMAL
BH CV LOWER VASCULAR LEFT COMMON FEMORAL SPONT: NORMAL
BH CV LOWER VASCULAR LEFT DISTAL FEMORAL COMPRESS: NORMAL
BH CV LOWER VASCULAR LEFT GASTRONEMIUS COMPRESS: NORMAL
BH CV LOWER VASCULAR LEFT GREATER SAPH AK COMPRESS: NORMAL
BH CV LOWER VASCULAR LEFT GREATER SAPH BK COMPRESS: NORMAL
BH CV LOWER VASCULAR LEFT LESSER SAPH COMPRESS: NORMAL
BH CV LOWER VASCULAR LEFT MID FEMORAL AUGMENT: NORMAL
BH CV LOWER VASCULAR LEFT MID FEMORAL COMPETENT: NORMAL
BH CV LOWER VASCULAR LEFT MID FEMORAL COMPRESS: NORMAL
BH CV LOWER VASCULAR LEFT MID FEMORAL PHASIC: NORMAL
BH CV LOWER VASCULAR LEFT MID FEMORAL SPONT: NORMAL
BH CV LOWER VASCULAR LEFT PERONEAL COMPRESS: NORMAL
BH CV LOWER VASCULAR LEFT POPLITEAL AUGMENT: NORMAL
BH CV LOWER VASCULAR LEFT POPLITEAL COMPETENT: NORMAL
BH CV LOWER VASCULAR LEFT POPLITEAL COMPRESS: NORMAL
BH CV LOWER VASCULAR LEFT POPLITEAL PHASIC: NORMAL
BH CV LOWER VASCULAR LEFT POPLITEAL SPONT: NORMAL
BH CV LOWER VASCULAR LEFT POSTERIOR TIBIAL COMPRESS: NORMAL
BH CV LOWER VASCULAR LEFT PROFUNDA FEMORAL COMPRESS: NORMAL
BH CV LOWER VASCULAR LEFT PROXIMAL FEMORAL COMPRESS: NORMAL
BH CV LOWER VASCULAR LEFT SAPHENOFEMORAL JUNCTION COMPRESS: NORMAL
BH CV LOWER VASCULAR RIGHT COMMON FEMORAL AUGMENT: NORMAL
BH CV LOWER VASCULAR RIGHT COMMON FEMORAL COMPETENT: NORMAL
BH CV LOWER VASCULAR RIGHT COMMON FEMORAL COMPRESS: NORMAL
BH CV LOWER VASCULAR RIGHT COMMON FEMORAL PHASIC: NORMAL
BH CV LOWER VASCULAR RIGHT COMMON FEMORAL SPONT: NORMAL
BH CV UPPER VENOUS LEFT AXILLARY AUGMENT: NORMAL
BH CV UPPER VENOUS LEFT AXILLARY COMPRESS: NORMAL
BH CV UPPER VENOUS LEFT AXILLARY PHASIC: NORMAL
BH CV UPPER VENOUS LEFT AXILLARY SPONT: NORMAL
BH CV UPPER VENOUS LEFT BASILIC FOREARM COMPRESS: NORMAL
BH CV UPPER VENOUS LEFT BASILIC UPPER COLOR: 1
BH CV UPPER VENOUS LEFT BASILIC UPPER COMPRESS: NORMAL
BH CV UPPER VENOUS LEFT BASILIC UPPER THROMBUS: NORMAL
BH CV UPPER VENOUS LEFT BRACHIAL COMPRESS: NORMAL
BH CV UPPER VENOUS LEFT CEPHALIC FOREARM COMPRESS: NORMAL
BH CV UPPER VENOUS LEFT CEPHALIC UPPER COMPRESS: NORMAL
BH CV UPPER VENOUS LEFT INTERNAL JUGULAR AUGMENT: NORMAL
BH CV UPPER VENOUS LEFT INTERNAL JUGULAR COMPRESS: NORMAL
BH CV UPPER VENOUS LEFT INTERNAL JUGULAR PHASIC: NORMAL
BH CV UPPER VENOUS LEFT INTERNAL JUGULAR SPONT: NORMAL
BH CV UPPER VENOUS LEFT RADIAL COMPRESS: NORMAL
BH CV UPPER VENOUS LEFT SUBCLAVIAN AUGMENT: NORMAL
BH CV UPPER VENOUS LEFT SUBCLAVIAN COMPRESS: NORMAL
BH CV UPPER VENOUS LEFT SUBCLAVIAN PHASIC: NORMAL
BH CV UPPER VENOUS LEFT SUBCLAVIAN SPONT: NORMAL
BH CV UPPER VENOUS LEFT ULNAR COMPRESS: NORMAL
BH CV UPPER VENOUS RIGHT INTERNAL JUGULAR AUGMENT: NORMAL
BH CV UPPER VENOUS RIGHT INTERNAL JUGULAR COMPRESS: NORMAL
BH CV UPPER VENOUS RIGHT INTERNAL JUGULAR PHASIC: NORMAL
BH CV UPPER VENOUS RIGHT INTERNAL JUGULAR SPONT: NORMAL
BH CV UPPER VENOUS RIGHT SUBCLAVIAN AUGMENT: NORMAL
BH CV UPPER VENOUS RIGHT SUBCLAVIAN COMPRESS: NORMAL
BH CV UPPER VENOUS RIGHT SUBCLAVIAN PHASIC: NORMAL
BH CV UPPER VENOUS RIGHT SUBCLAVIAN SPONT: NORMAL
BH CV XLRA - RV BASE: 3.8 CM
BH CV XLRA - RV LENGTH: 6.5 CM
BH CV XLRA - RV MID: 3.3 CM
BH CV XLRA - TDI S': 15.4 CM/SEC
BILIRUB SERPL-MCNC: 1 MG/DL (ref 0–1.2)
BILIRUB UR QL STRIP: NEGATIVE
BUN SERPL-MCNC: 13 MG/DL (ref 6–20)
BUN/CREAT SERPL: 10.6 (ref 7–25)
CALCIUM SPEC-SCNC: 9.6 MG/DL (ref 8.6–10.5)
CHLORIDE SERPL-SCNC: 104 MMOL/L (ref 98–107)
CHOLEST SERPL-MCNC: 167 MG/DL (ref 0–200)
CHROMATIN AB SERPL-ACNC: 31 IU/ML (ref 0–14)
CLARITY UR: CLEAR
CO2 SERPL-SCNC: 26.5 MMOL/L (ref 22–29)
COLOR UR: YELLOW
CREAT SERPL-MCNC: 1.23 MG/DL (ref 0.76–1.27)
CRP SERPL-MCNC: 0.74 MG/DL (ref 0–0.5)
D DIMER PPP FEU-MCNC: 0.37 MCGFEU/ML (ref 0–0.53)
DEPRECATED RDW RBC AUTO: 45.6 FL (ref 37–54)
EGFRCR SERPLBLD CKD-EPI 2021: 70.2 ML/MIN/1.73
EOSINOPHIL # BLD AUTO: 0.25 10*3/MM3 (ref 0–0.4)
EOSINOPHIL NFR BLD AUTO: 3.9 % (ref 0.3–6.2)
ERYTHROCYTE [DISTWIDTH] IN BLOOD BY AUTOMATED COUNT: 13.6 % (ref 12.3–15.4)
ERYTHROCYTE [SEDIMENTATION RATE] IN BLOOD: 14 MM/HR (ref 0–20)
GEN 5 2HR TROPONIN T REFLEX: 6 NG/L
GLOBULIN UR ELPH-MCNC: 2.8 GM/DL
GLUCOSE BLDC GLUCOMTR-MCNC: 119 MG/DL (ref 70–130)
GLUCOSE BLDC GLUCOMTR-MCNC: 140 MG/DL (ref 70–130)
GLUCOSE SERPL-MCNC: 103 MG/DL (ref 65–99)
GLUCOSE UR STRIP-MCNC: NEGATIVE MG/DL
HBA1C MFR BLD: 4.5 % (ref 4.8–5.6)
HCT VFR BLD AUTO: 50.9 % (ref 37.5–51)
HDLC SERPL-MCNC: 32 MG/DL (ref 40–60)
HGB BLD-MCNC: 17.2 G/DL (ref 13–17.7)
HGB UR QL STRIP.AUTO: NEGATIVE
IMM GRANULOCYTES # BLD AUTO: 0.05 10*3/MM3 (ref 0–0.05)
IMM GRANULOCYTES NFR BLD AUTO: 0.8 % (ref 0–0.5)
INR PPP: 1.1
KETONES UR QL STRIP: NEGATIVE
LDLC SERPL CALC-MCNC: 111 MG/DL (ref 0–100)
LDLC/HDLC SERPL: 3.38 {RATIO}
LEFT ATRIUM VOLUME INDEX: 16.8 ML/M2
LEUKOCYTE ESTERASE UR QL STRIP.AUTO: NEGATIVE
LYMPHOCYTES # BLD AUTO: 2.32 10*3/MM3 (ref 0.7–3.1)
LYMPHOCYTES NFR BLD AUTO: 35.7 % (ref 19.6–45.3)
MCH RBC QN AUTO: 30.7 PG (ref 26.6–33)
MCHC RBC AUTO-ENTMCNC: 33.8 G/DL (ref 31.5–35.7)
MCV RBC AUTO: 90.7 FL (ref 79–97)
MONOCYTES # BLD AUTO: 0.37 10*3/MM3 (ref 0.1–0.9)
MONOCYTES NFR BLD AUTO: 5.7 % (ref 5–12)
NEUTROPHILS NFR BLD AUTO: 3.48 10*3/MM3 (ref 1.7–7)
NEUTROPHILS NFR BLD AUTO: 53.6 % (ref 42.7–76)
NITRITE UR QL STRIP: NEGATIVE
NT-PROBNP SERPL-MCNC: 36 PG/ML (ref 0–900)
PH UR STRIP.AUTO: <=5 [PH] (ref 5–8)
PLATELET # BLD AUTO: 165 10*3/MM3 (ref 140–450)
PMV BLD AUTO: 9.7 FL (ref 6–12)
POTASSIUM SERPL-SCNC: 3.8 MMOL/L (ref 3.5–5.2)
PROT SERPL-MCNC: 7.2 G/DL (ref 6–8.5)
PROT UR QL STRIP: NEGATIVE
PROTHROMBIN TIME: 12.4 SECONDS (ref 11–15)
RBC # BLD AUTO: 5.61 10*6/MM3 (ref 4.14–5.8)
SINUS: 2.9 CM
SODIUM SERPL-SCNC: 139 MMOL/L (ref 136–145)
SP GR UR STRIP: 1.02 (ref 1–1.03)
STJ: 3 CM
TRIGL SERPL-MCNC: 134 MG/DL (ref 0–150)
TROPONIN T DELTA: -2 NG/L
TROPONIN T SERPL HS-MCNC: 8 NG/L
UROBILINOGEN UR QL STRIP: NORMAL
VLDLC SERPL-MCNC: 24 MG/DL (ref 5–40)
WBC NRBC COR # BLD AUTO: 6.49 10*3/MM3 (ref 3.4–10.8)

## 2024-07-08 PROCEDURE — 93005 ELECTROCARDIOGRAM TRACING: CPT | Performed by: EMERGENCY MEDICINE

## 2024-07-08 PROCEDURE — 25010000002 METHYLPREDNISOLONE PER 125 MG: Performed by: EMERGENCY MEDICINE

## 2024-07-08 PROCEDURE — 93971 EXTREMITY STUDY: CPT

## 2024-07-08 PROCEDURE — 71045 X-RAY EXAM CHEST 1 VIEW: CPT

## 2024-07-08 PROCEDURE — 96375 TX/PRO/DX INJ NEW DRUG ADDON: CPT

## 2024-07-08 PROCEDURE — 80053 COMPREHEN METABOLIC PANEL: CPT | Performed by: EMERGENCY MEDICINE

## 2024-07-08 PROCEDURE — 93306 TTE W/DOPPLER COMPLETE: CPT | Performed by: INTERNAL MEDICINE

## 2024-07-08 PROCEDURE — 84484 ASSAY OF TROPONIN QUANT: CPT | Performed by: EMERGENCY MEDICINE

## 2024-07-08 PROCEDURE — 81003 URINALYSIS AUTO W/O SCOPE: CPT | Performed by: EMERGENCY MEDICINE

## 2024-07-08 PROCEDURE — 86225 DNA ANTIBODY NATIVE: CPT | Performed by: EMERGENCY MEDICINE

## 2024-07-08 PROCEDURE — 63710000001 ONDANSETRON ODT 4 MG TABLET DISPERSIBLE: Performed by: EMERGENCY MEDICINE

## 2024-07-08 PROCEDURE — 25010000002 MORPHINE PER 10 MG: Performed by: EMERGENCY MEDICINE

## 2024-07-08 PROCEDURE — 82948 REAGENT STRIP/BLOOD GLUCOSE: CPT

## 2024-07-08 PROCEDURE — 25810000003 SODIUM CHLORIDE 0.9 % SOLUTION: Performed by: EMERGENCY MEDICINE

## 2024-07-08 PROCEDURE — 93971 EXTREMITY STUDY: CPT | Performed by: SURGERY

## 2024-07-08 PROCEDURE — 99285 EMERGENCY DEPT VISIT HI MDM: CPT

## 2024-07-08 PROCEDURE — 25010000002 METHYLPREDNISOLONE PER 125 MG: Performed by: INTERNAL MEDICINE

## 2024-07-08 PROCEDURE — 83036 HEMOGLOBIN GLYCOSYLATED A1C: CPT | Performed by: EMERGENCY MEDICINE

## 2024-07-08 PROCEDURE — 99204 OFFICE O/P NEW MOD 45 MIN: CPT | Performed by: INTERNAL MEDICINE

## 2024-07-08 PROCEDURE — 85025 COMPLETE CBC W/AUTO DIFF WBC: CPT | Performed by: EMERGENCY MEDICINE

## 2024-07-08 PROCEDURE — 96374 THER/PROPH/DIAG INJ IV PUSH: CPT

## 2024-07-08 PROCEDURE — 83880 ASSAY OF NATRIURETIC PEPTIDE: CPT | Performed by: EMERGENCY MEDICINE

## 2024-07-08 PROCEDURE — 85652 RBC SED RATE AUTOMATED: CPT | Performed by: EMERGENCY MEDICINE

## 2024-07-08 PROCEDURE — 93306 TTE W/DOPPLER COMPLETE: CPT

## 2024-07-08 PROCEDURE — 85379 FIBRIN DEGRADATION QUANT: CPT | Performed by: EMERGENCY MEDICINE

## 2024-07-08 PROCEDURE — 85610 PROTHROMBIN TIME: CPT

## 2024-07-08 PROCEDURE — 25510000001 PERFLUTREN (DEFINITY) 8.476 MG IN SODIUM CHLORIDE (PF) 0.9 % 10 ML INJECTION: Performed by: EMERGENCY MEDICINE

## 2024-07-08 PROCEDURE — 86038 ANTINUCLEAR ANTIBODIES: CPT | Performed by: EMERGENCY MEDICINE

## 2024-07-08 PROCEDURE — 80061 LIPID PANEL: CPT | Performed by: EMERGENCY MEDICINE

## 2024-07-08 PROCEDURE — 86140 C-REACTIVE PROTEIN: CPT | Performed by: EMERGENCY MEDICINE

## 2024-07-08 PROCEDURE — 36415 COLL VENOUS BLD VENIPUNCTURE: CPT

## 2024-07-08 PROCEDURE — 25010000002 ONDANSETRON PER 1 MG: Performed by: EMERGENCY MEDICINE

## 2024-07-08 PROCEDURE — G0378 HOSPITAL OBSERVATION PER HR: HCPCS

## 2024-07-08 PROCEDURE — 96376 TX/PRO/DX INJ SAME DRUG ADON: CPT

## 2024-07-08 PROCEDURE — 86431 RHEUMATOID FACTOR QUANT: CPT | Performed by: EMERGENCY MEDICINE

## 2024-07-08 PROCEDURE — 93010 ELECTROCARDIOGRAM REPORT: CPT | Performed by: INTERNAL MEDICINE

## 2024-07-08 PROCEDURE — 99284 EMERGENCY DEPT VISIT MOD MDM: CPT | Performed by: EMERGENCY MEDICINE

## 2024-07-08 PROCEDURE — 25010000002 KETOROLAC TROMETHAMINE PER 15 MG

## 2024-07-08 RX ORDER — DIPHENHYDRAMINE HCL 25 MG
25 CAPSULE ORAL EVERY 6 HOURS PRN
Status: DISCONTINUED | OUTPATIENT
Start: 2024-07-08 | End: 2024-07-09 | Stop reason: HOSPADM

## 2024-07-08 RX ORDER — METHYLPREDNISOLONE SODIUM SUCCINATE 125 MG/2ML
80 INJECTION, POWDER, LYOPHILIZED, FOR SOLUTION INTRAMUSCULAR; INTRAVENOUS ONCE
Status: COMPLETED | OUTPATIENT
Start: 2024-07-08 | End: 2024-07-08

## 2024-07-08 RX ORDER — HYDROCODONE BITARTRATE AND ACETAMINOPHEN 10; 325 MG/1; MG/1
1 TABLET ORAL EVERY 6 HOURS PRN
Status: DISCONTINUED | OUTPATIENT
Start: 2024-07-08 | End: 2024-07-09 | Stop reason: HOSPADM

## 2024-07-08 RX ORDER — METHYLPREDNISOLONE SODIUM SUCCINATE 125 MG/2ML
60 INJECTION, POWDER, LYOPHILIZED, FOR SOLUTION INTRAMUSCULAR; INTRAVENOUS EVERY 8 HOURS
Status: DISCONTINUED | OUTPATIENT
Start: 2024-07-08 | End: 2024-07-09 | Stop reason: HOSPADM

## 2024-07-08 RX ORDER — ONDANSETRON 2 MG/ML
4 INJECTION INTRAMUSCULAR; INTRAVENOUS ONCE
Status: COMPLETED | OUTPATIENT
Start: 2024-07-08 | End: 2024-07-08

## 2024-07-08 RX ORDER — ASPIRIN 81 MG/1
81 TABLET, CHEWABLE ORAL ONCE
Status: COMPLETED | OUTPATIENT
Start: 2024-07-09 | End: 2024-07-08

## 2024-07-08 RX ORDER — BISACODYL 10 MG
10 SUPPOSITORY, RECTAL RECTAL DAILY PRN
Status: DISCONTINUED | OUTPATIENT
Start: 2024-07-08 | End: 2024-07-09 | Stop reason: HOSPADM

## 2024-07-08 RX ORDER — POLYETHYLENE GLYCOL 3350 17 G/17G
17 POWDER, FOR SOLUTION ORAL DAILY PRN
Status: DISCONTINUED | OUTPATIENT
Start: 2024-07-08 | End: 2024-07-09 | Stop reason: HOSPADM

## 2024-07-08 RX ORDER — KETOROLAC TROMETHAMINE 15 MG/ML
15 INJECTION, SOLUTION INTRAMUSCULAR; INTRAVENOUS ONCE AS NEEDED
Status: DISCONTINUED | OUTPATIENT
Start: 2024-07-08 | End: 2024-07-09

## 2024-07-08 RX ORDER — SODIUM CHLORIDE 9 MG/ML
125 INJECTION, SOLUTION INTRAVENOUS CONTINUOUS
Status: DISCONTINUED | OUTPATIENT
Start: 2024-07-08 | End: 2024-07-09 | Stop reason: HOSPADM

## 2024-07-08 RX ORDER — LUBIPROSTONE 8 UG/1
8 CAPSULE ORAL 2 TIMES DAILY
Status: DISCONTINUED | OUTPATIENT
Start: 2024-07-08 | End: 2024-07-09 | Stop reason: HOSPADM

## 2024-07-08 RX ORDER — SODIUM CHLORIDE 0.9 % (FLUSH) 0.9 %
10 SYRINGE (ML) INJECTION AS NEEDED
Status: DISCONTINUED | OUTPATIENT
Start: 2024-07-08 | End: 2024-07-09 | Stop reason: HOSPADM

## 2024-07-08 RX ORDER — PANTOPRAZOLE SODIUM 40 MG/1
40 TABLET, DELAYED RELEASE ORAL AS NEEDED
Status: DISCONTINUED | OUTPATIENT
Start: 2024-07-08 | End: 2024-07-08

## 2024-07-08 RX ORDER — BETAMETHASONE DIPROPIONATE 0.5 MG/G
CREAM TOPICAL EVERY 12 HOURS SCHEDULED
Status: DISCONTINUED | OUTPATIENT
Start: 2024-07-08 | End: 2024-07-08

## 2024-07-08 RX ORDER — ASPIRIN 81 MG/1
81 TABLET ORAL DAILY
Status: DISCONTINUED | OUTPATIENT
Start: 2024-07-09 | End: 2024-07-09 | Stop reason: HOSPADM

## 2024-07-08 RX ORDER — IBUPROFEN 600 MG/1
1 TABLET ORAL
Status: DISCONTINUED | OUTPATIENT
Start: 2024-07-08 | End: 2024-07-09 | Stop reason: HOSPADM

## 2024-07-08 RX ORDER — SODIUM CHLORIDE 9 MG/ML
40 INJECTION, SOLUTION INTRAVENOUS AS NEEDED
Status: DISCONTINUED | OUTPATIENT
Start: 2024-07-08 | End: 2024-07-09 | Stop reason: HOSPADM

## 2024-07-08 RX ORDER — DEXTROSE MONOHYDRATE 25 G/50ML
25 INJECTION, SOLUTION INTRAVENOUS
Status: DISCONTINUED | OUTPATIENT
Start: 2024-07-08 | End: 2024-07-09 | Stop reason: HOSPADM

## 2024-07-08 RX ORDER — SODIUM CHLORIDE 0.9 % (FLUSH) 0.9 %
10 SYRINGE (ML) INJECTION EVERY 12 HOURS SCHEDULED
Status: DISCONTINUED | OUTPATIENT
Start: 2024-07-08 | End: 2024-07-09 | Stop reason: HOSPADM

## 2024-07-08 RX ORDER — AMOXICILLIN 250 MG
2 CAPSULE ORAL 2 TIMES DAILY
Status: DISCONTINUED | OUTPATIENT
Start: 2024-07-08 | End: 2024-07-09 | Stop reason: HOSPADM

## 2024-07-08 RX ORDER — BISACODYL 5 MG/1
5 TABLET, DELAYED RELEASE ORAL DAILY PRN
Status: DISCONTINUED | OUTPATIENT
Start: 2024-07-08 | End: 2024-07-09 | Stop reason: HOSPADM

## 2024-07-08 RX ORDER — ASPIRIN 325 MG
325 TABLET ORAL ONCE
Status: COMPLETED | OUTPATIENT
Start: 2024-07-08 | End: 2024-07-08

## 2024-07-08 RX ORDER — NITROGLYCERIN 0.4 MG/1
0.4 TABLET SUBLINGUAL
Status: DISCONTINUED | OUTPATIENT
Start: 2024-07-08 | End: 2024-07-09 | Stop reason: HOSPADM

## 2024-07-08 RX ORDER — ACETAMINOPHEN 325 MG/1
325 TABLET ORAL EVERY 6 HOURS PRN
Status: DISCONTINUED | OUTPATIENT
Start: 2024-07-08 | End: 2024-07-09 | Stop reason: HOSPADM

## 2024-07-08 RX ORDER — ALPRAZOLAM 0.5 MG/1
2 TABLET ORAL DAILY PRN
Status: DISCONTINUED | OUTPATIENT
Start: 2024-07-08 | End: 2024-07-09 | Stop reason: HOSPADM

## 2024-07-08 RX ORDER — MORPHINE SULFATE 4 MG/ML
4 INJECTION, SOLUTION INTRAMUSCULAR; INTRAVENOUS ONCE
Status: COMPLETED | OUTPATIENT
Start: 2024-07-08 | End: 2024-07-08

## 2024-07-08 RX ORDER — BUDESONIDE AND FORMOTEROL FUMARATE DIHYDRATE 160; 4.5 UG/1; UG/1
2 AEROSOL RESPIRATORY (INHALATION) DAILY
Status: DISCONTINUED | OUTPATIENT
Start: 2024-07-08 | End: 2024-07-09 | Stop reason: HOSPADM

## 2024-07-08 RX ORDER — NICOTINE POLACRILEX 4 MG
15 LOZENGE BUCCAL
Status: DISCONTINUED | OUTPATIENT
Start: 2024-07-08 | End: 2024-07-09 | Stop reason: HOSPADM

## 2024-07-08 RX ORDER — ALBUTEROL SULFATE 0.63 MG/3ML
1 SOLUTION RESPIRATORY (INHALATION) EVERY 6 HOURS PRN
Status: DISCONTINUED | OUTPATIENT
Start: 2024-07-08 | End: 2024-07-09 | Stop reason: HOSPADM

## 2024-07-08 RX ORDER — PANTOPRAZOLE SODIUM 40 MG/10ML
40 INJECTION, POWDER, LYOPHILIZED, FOR SOLUTION INTRAVENOUS
Status: DISCONTINUED | OUTPATIENT
Start: 2024-07-08 | End: 2024-07-09 | Stop reason: HOSPADM

## 2024-07-08 RX ORDER — INSULIN LISPRO 100 [IU]/ML
2-7 INJECTION, SOLUTION INTRAVENOUS; SUBCUTANEOUS
Status: DISCONTINUED | OUTPATIENT
Start: 2024-07-08 | End: 2024-07-09 | Stop reason: HOSPADM

## 2024-07-08 RX ORDER — ONDANSETRON 4 MG/1
4 TABLET, ORALLY DISINTEGRATING ORAL EVERY 6 HOURS PRN
Status: DISCONTINUED | OUTPATIENT
Start: 2024-07-08 | End: 2024-07-09 | Stop reason: HOSPADM

## 2024-07-08 RX ADMIN — BETAMETHASONE DIPROPIONATE: 0.5 CREAM TOPICAL at 22:05

## 2024-07-08 RX ADMIN — NITROGLYCERIN 0.4 MG: 0.4 TABLET SUBLINGUAL at 08:33

## 2024-07-08 RX ADMIN — ASPIRIN 81 MG: 81 TABLET, CHEWABLE ORAL at 23:40

## 2024-07-08 RX ADMIN — MORPHINE SULFATE 4 MG: 4 INJECTION, SOLUTION INTRAMUSCULAR; INTRAVENOUS at 09:17

## 2024-07-08 RX ADMIN — KETOROLAC TROMETHAMINE 15 MG: 15 INJECTION, SOLUTION INTRAMUSCULAR; INTRAVENOUS at 22:27

## 2024-07-08 RX ADMIN — LUBIPROSTONE 8 MCG: 8 CAPSULE, GELATIN COATED ORAL at 22:06

## 2024-07-08 RX ADMIN — ONDANSETRON 4 MG: 2 INJECTION INTRAMUSCULAR; INTRAVENOUS at 08:31

## 2024-07-08 RX ADMIN — SODIUM CHLORIDE 1000 ML: 9 INJECTION, SOLUTION INTRAVENOUS at 08:44

## 2024-07-08 RX ADMIN — PERFLUTREN 2 ML: 6.52 INJECTION, SUSPENSION INTRAVENOUS at 12:39

## 2024-07-08 RX ADMIN — METHYLPREDNISOLONE SODIUM SUCCINATE 80 MG: 125 INJECTION INTRAMUSCULAR; INTRAVENOUS at 15:20

## 2024-07-08 RX ADMIN — SODIUM CHLORIDE 125 ML/HR: 9 INJECTION, SOLUTION INTRAVENOUS at 08:31

## 2024-07-08 RX ADMIN — PANTOPRAZOLE SODIUM 40 MG: 40 INJECTION, POWDER, FOR SOLUTION INTRAVENOUS at 22:04

## 2024-07-08 RX ADMIN — HYDROCODONE BITARTRATE AND ACETAMINOPHEN 1 TABLET: 10; 325 TABLET ORAL at 15:20

## 2024-07-08 RX ADMIN — Medication 10 ML: at 11:56

## 2024-07-08 RX ADMIN — ONDANSETRON 4 MG: 4 TABLET, ORALLY DISINTEGRATING ORAL at 22:28

## 2024-07-08 RX ADMIN — ASPIRIN 325 MG: 325 TABLET ORAL at 07:39

## 2024-07-08 RX ADMIN — METHYLPREDNISOLONE SODIUM SUCCINATE 60 MG: 125 INJECTION INTRAMUSCULAR; INTRAVENOUS at 19:45

## 2024-07-08 NOTE — PLAN OF CARE
Goal Outcome Evaluation: pt was admitted for chest pain which has since resolved. Cardiology following. Bolus running at 300ml.  Pt. Has complained of Headache and joint pain, see mar for pain interventions. Pt is A/O x4, is agreeable to the plan of care and verbalizes understanding.       Problem: Adult Inpatient Plan of Care  Goal: Plan of Care Review  Outcome: Ongoing, Progressing  Goal: Patient-Specific Goal (Individualized)  Outcome: Ongoing, Progressing  Goal: Absence of Hospital-Acquired Illness or Injury  Outcome: Ongoing, Progressing  Intervention: Identify and Manage Fall Risk  Recent Flowsheet Documentation  Taken 7/8/2024 1827 by Lina Arevalo RN  Safety Promotion/Fall Prevention:   safety round/check completed   room organization consistent  Taken 7/8/2024 1600 by Lina Arevalo RN  Safety Promotion/Fall Prevention:   room organization consistent   safety round/check completed  Taken 7/8/2024 1400 by Lina Arevalo RN  Safety Promotion/Fall Prevention: patient off unit  Taken 7/8/2024 1200 by Lina Arevalo RN  Safety Promotion/Fall Prevention: patient off unit  Taken 7/8/2024 1024 by Lina Arevalo RN  Safety Promotion/Fall Prevention:   room organization consistent   safety round/check completed  Intervention: Prevent Skin Injury  Recent Flowsheet Documentation  Taken 7/8/2024 1827 by Lina Arevalo RN  Body Position: position changed independently  Taken 7/8/2024 1600 by Lina Arevalo RN  Body Position: position changed independently  Taken 7/8/2024 1024 by Lina Arevalo RN  Body Position: position changed independently  Intervention: Prevent and Manage VTE (Venous Thromboembolism) Risk  Recent Flowsheet Documentation  Taken 7/8/2024 1827 by Lina Arevalo RN  Activity Management: activity encouraged  Taken 7/8/2024 1024 by Lina Arevalo RN  Activity Management: activity encouraged  Intervention: Prevent Infection  Recent Flowsheet Documentation  Taken 7/8/2024 1827  by Lina Arevalo, RN  Infection Prevention: single patient room provided  Taken 7/8/2024 1600 by Lina Arevalo, RN  Infection Prevention: single patient room provided  Goal: Optimal Comfort and Wellbeing  Outcome: Ongoing, Progressing  Intervention: Provide Person-Centered Care  Recent Flowsheet Documentation  Taken 7/8/2024 1024 by Lina Arevalo, RN  Trust Relationship/Rapport:   care explained   questions answered   reassurance provided  Goal: Readiness for Transition of Care  Outcome: Ongoing, Progressing  Intervention: Mutually Develop Transition Plan  Recent Flowsheet Documentation  Taken 7/8/2024 1303 by Lina Arevalo, RN  Transportation Anticipated: family or friend will provide  Patient/Family Anticipated Services at Transition: none  Patient/Family Anticipates Transition to: home with family  Taken 7/8/2024 1300 by Lina Arevalo, RN  Equipment Currently Used at Home: none

## 2024-07-08 NOTE — ED NOTES
"Nursing report ED to floor  Al Peña  54 y.o.  male    HPI :  HPI (Adult)  Stated Reason for Visit: midsternal to left sided chest pain x a few days with SOA, states pain radiates to left arm and feels like left hand swells throughout the day  History Obtained From: patient  Duration (Days): 3    Chief Complaint  Chief Complaint   Patient presents with    Chest Pain       Admitting doctor:   Russell Lyn MD    Admitting diagnosis:   The encounter diagnosis was Chest pain, unspecified type.    Code status:   Current Code Status       Date Active Code Status Order ID Comments User Context       Not on file            Allergies:   Oxycodone-acetaminophen, Promethazine, Sulfa antibiotics, Neomycin, Clonazepam, Duloxetine hcl, Niacin, and Pregabalin    Isolation:   No active isolations    Intake and Output  No intake or output data in the 24 hours ending 07/08/24 0855    Weight:       07/08/24 0733   Weight: 132 kg (290 lb)       Most recent vitals:   Vitals:    07/08/24 0733 07/08/24 0800 07/08/24 0833 07/08/24 0844   BP: 128/84 104/68 106/73 (!) 86/57   BP Location: Left arm      Patient Position: Sitting      Pulse: 79 70 74 73   Resp: 24 20     Temp: 97.7 °F (36.5 °C)      TempSrc: Oral      SpO2: 97% 97%  95%   Weight: 132 kg (290 lb)      Height: 182.9 cm (72\")          Active LDAs/IV Access:   Lines, Drains & Airways       Active LDAs       Name Placement date Placement time Site Days    Peripheral IV 07/08/24 0739 Right Antecubital 07/08/24 0739  Antecubital  less than 1                    Labs (abnormal labs have a star):   Labs Reviewed   COMPREHENSIVE METABOLIC PANEL - Abnormal; Notable for the following components:       Result Value    Glucose 103 (*)     All other components within normal limits    Narrative:     GFR Normal >60  Chronic Kidney Disease <60  Kidney Failure <15     CBC WITH AUTO DIFFERENTIAL - Abnormal; Notable for the following components:    Immature Grans % 0.8 (*)     All " other components within normal limits   TROPONIN - Normal    Narrative:     High Sensitive Troponin T Reference Range:  <14.0 ng/L- Negative Female for AMI  <22.0 ng/L- Negative Male for AMI  >=14 - Abnormal Female indicating possible myocardial injury.  >=22 - Abnormal Male indicating possible myocardial injury.   Clinicians would have to utilize clinical acumen, EKG, Troponin, and serial changes to determine if it is an Acute Myocardial Infarction or myocardial injury due to an underlying chronic condition.        BNP (IN-HOUSE) - Normal    Narrative:     This assay is used as an aid in the diagnosis of individuals suspected of having heart failure. It can be used as an aid in the diagnosis of acute decompensated heart failure (ADHF) in patients presenting with signs and symptoms of ADHF to the emergency department (ED). In addition, NT-proBNP of <300 pg/mL indicates ADHF is not likely.    Age Range Result Interpretation  NT-proBNP Concentration (pg/mL:      <50             Positive            >450                   Gray                 300-450                    Negative             <300    50-75           Positive            >900                  Gray                300-900                  Negative            <300      >75             Positive            >1800                  Gray                300-1800                  Negative            <300   D-DIMER, QUANTITATIVE - Normal    Narrative:     According to the assay 's published package insert, a normal (<0.50 MCGFEU/mL) D-dimer result in conjunction with a non-high clinical probability assessment, excludes deep vein thrombosis (DVT) and pulmonary embolism (PE) with high sensitivity.    D-dimer values increase with age and this can make VTE exclusion of an older population difficult. To address this, the [employer], based on best available evidence and recent guidelines, recommends that clinicians use age-adjusted D-dimer  "thresholds in patients greater than 50 years of age with: a) a low probability of PE who do not meet all Pulmonary Embolism Rule Out Criteria, or b) in those with intermediate probability of PE.   The formula for an age-adjusted D-dimer cut-off is \"age/100\".  For example, a 60 year old patient would have an age-adjusted cut-off of 0.60 MCGFEU/mL and an 80 year old 0.80 MCGFEU/mL.   LAB PROTIME-INR, FINGERSTICK   HIGH SENSITIVITIY TROPONIN T 2HR   LIPID PANEL   POCT PROTIME - INR   CBC AND DIFFERENTIAL    Narrative:     The following orders were created for panel order CBC & Differential.  Procedure                               Abnormality         Status                     ---------                               -----------         ------                     CBC Auto Differential[[id number]]        Abnormal            Final result                 Please view results for these tests on the individual orders.       EKG:   ECG 12 Lead ED Triage Standing Order; Chest Pain   Preliminary Result   HEART RATE=81  bpm   RR Fxlcnmvn=173  ms   VT Qqogjcbm=351  ms   P Horizontal Axis=15  deg   P Front Axis=46  deg   QRSD Interval=87  ms   QT Yviopvtt=912  ms   AXoS=802  ms   QRS Axis=1  deg   T Wave Axis=9  deg   - NORMAL ECG -   Sinus rhythm   Date and Time of Study:2024-07-08 07:32:45      ECG 12 Lead    (Results Pending)   ECG 12 Lead Chest Pain    (Results Pending)   ECG 12 Lead Chest Pain    (Results Pending)   ECG 12 Lead Chest Pain    (Results Pending)       Meds given in ED:   Medications   sodium chloride 0.9 % flush 10 mL (has no administration in time range)   nitroglycerin (NITROSTAT) SL tablet 0.4 mg (0.4 mg Sublingual Given 7/8/24 0833)   sodium chloride 0.9 % infusion (125 mL/hr Intravenous New Bag 7/8/24 0831)   sodium chloride 0.9 % flush 10 mL (has no administration in time range)   sodium chloride 0.9 % flush 10 mL (has no administration in time range)   sodium chloride 0.9 % infusion 40 mL (has no " administration in time range)   sennosides-docusate (PERICOLACE) 8.6-50 MG per tablet 2 tablet (has no administration in time range)     And   polyethylene glycol (MIRALAX) packet 17 g (has no administration in time range)     And   bisacodyl (DULCOLAX) EC tablet 5 mg (has no administration in time range)     And   bisacodyl (DULCOLAX) suppository 10 mg (has no administration in time range)   aspirin chewable tablet 81 mg (has no administration in time range)     And   aspirin EC tablet 81 mg (has no administration in time range)   Morphine sulfate (PF) injection 4 mg (has no administration in time range)   sodium chloride 0.9 % bolus 1,000 mL (has no administration in time range)   aspirin tablet 325 mg (325 mg Oral Given 7/8/24 0739)   ondansetron (ZOFRAN) injection 4 mg (4 mg Intravenous Given 7/8/24 8547)       Imaging results:  XR Chest 1 View    Result Date: 7/8/2024  No evidence for active disease in the chest.  This report was finalized on 7/8/2024 8:11 AM by Dr. Russell Jerome M.D on Workstation: [id number]       Ambulatory status:   - up ad bharati    Social issues:   Social History     Socioeconomic History    Marital status:    Tobacco Use    Smoking status: Never    Smokeless tobacco: Never   Vaping Use    Vaping status: Never Used   Substance and Sexual Activity    Alcohol use: No    Drug use: No    Sexual activity: Defer       Peripheral Neurovascular  Peripheral Neurovascular (Adult)  Peripheral Neurovascular WDL: .WDL except  Additional Documentation: Edema (Group)  Edema  Edema: arm, left  Arm, Left Edema: other (see comments) (mild non-pitting edema)    Neuro Cognitive  Neuro Cognitive (Adult)  Cognitive/Neuro/Behavioral WDL: WDL    Learning  Learning Assessment (Adult)  Learning Readiness and Ability: no barriers identified  Education Provided  Person Taught: patient  Teaching Method: verbal instruction  Teaching Focus: diagnostic test    Respiratory  Respiratory WDL  Respiratory WDL: .WDL  except, rhythm/pattern  Rhythm/Pattern, Respiratory: tachypneic, shortness of breath    Abdominal Pain       Pain Assessments  Pain (Adult)  (0-10) Pain Rating: Rest: 7  Pain Location: head  Pain Side/Orientation: left  Pain Onset/Duration: pt states no change in CP after NTG but now C/O headache. MD Redman notified  Response to Pain Interventions: intervention not effective per patient  Pain Assessment Additional Detail  Pain Onset/Duration: pt states no change in CP after NTG but now C/O headache. MD Redman notified    NIH Stroke Scale       Amari Jacques RN  07/08/24 08:55 EDT

## 2024-07-08 NOTE — CONSULTS
Date of Consultation: 24    Referral Provider: Enzo Lyn MD.    Reason for Consultation: Chest pain.     Encounter Provider: Jason Jimenez MD    Group of Service: [facility]     Patient Name: Al Peña    :1970    Chief complaint: Chest pain.     History of Present Illness:      This is a very pleasant 54 year-old male with a past medical history significant for HIV, type 2 diabetes, hypertension, hyperlipidemia, obstructive sleep apnea (wears 3L nasal cannula at night), hypothyroidism, and history of rectal cancer status post chemotherapy and radiation. He also has a history of shingles and received the shingles vaccine 1 week ago. Over the past year, he has intentionally lost a significant amount of weight.     He presented to the emergency department on 2024 with complaints of intermittent left-sided chest pain for 3 days. He described the pain as brief sharp/stabbing pain over the 3 day period, however today, the pain was more pressure than stabbing. He had associated nausea, dyspnea, and mild diaphoresis. In addition, he has had some left upper extremity and left lower extremity swelling and discomfort. His venous dopplers of his upper and lower extremities are still pending.  He does state that these seem to have been worse since getting the shingles vaccine.  He has had a recurrent butterfly like rash, and this also was exacerbated.  He also has multiple other symptoms which are likely consistent with systemic lupus, including photosensitivity among others.  He is scheduled to see a [place] rheumatologist next month, although he has had a difficult time getting in with a rheumatologist in general.  He states that he has severe diffuse joint aches which have been fairly excruciating recently.    His workup was notable for 2 negative troponins (8,6) and a HGB A1c of 4.5. The chest x-ray was negative for active disease in the chest. EKG was nonischemic.  He did  have an echocardiogram just after arrival which showed an EF of 65 to 70%.      ECHO 7/8/2024    Left ventricular systolic function is normal. Left ventricular ejection fraction appears to be 66 - 70%.    Left ventricular diastolic function was normal.    Normal right ventricular cavity size and systolic function noted.    The left atrial cavity is mildly dilated.    Trace to mild tricuspid valve regurgitation is present.    Calculated right ventricular systolic pressure from tricuspid regurgitation is 30 mmHg.    There is no evidence of pericardial effusion.         Past Medical History:   Diagnosis Date    Anesthesia complication     ET TUBE CUFF WASN'T FULLY DEFLATED UPON REMOVAL AFTER GALLBLADDER SURGERY.  REQUIRED HOSPITALIZATION X1 WEEK AFTERWARDS    Anxiety     Diabetes mellitus     Disease of thyroid gland     Elevated triglycerides with high cholesterol     Fatty liver     Hard to intubate     WITH GALLBLADDER SURGERY    Heart palpitations     History of chemotherapy 2019    History of radiation therapy     HIV disease     On home oxygen therapy     3L NC AT NIGHT    Rectal cancer     Shingles     RECURRING, AT LEAST 8 TIMES    Slow gastric motility     Slow to wake up after anesthesia          Past Surgical History:   Procedure Laterality Date    AXILLARY LYMPH NODE BIOPSY/EXCISION Left     CHOLECYSTECTOMY  2002    COLONOSCOPY N/A 07/08/2016    Procedure: COLONOSCOPY TO CECUM;  Surgeon: Georges Gusman MD;  Location: [facility] ENDOSCOPY;  Service:     COLONOSCOPY N/A 12/15/2021    Procedure: COLONOSCOPY INTO CECUM AND TI WITH BIOPSIES;  Surgeon: Celso Odonnell MD;  Location: [facility] ENDOSCOPY;  Service: Gastroenterology;  Laterality: N/A;  PRE: FECAL INCONTINENCE  POST: DECREASED VASCULARITY    ENDOSCOPY N/A 07/08/2016    Procedure: ESOPHAGOGASTRODUODENOSCOPY WITH COLD BIOPSIES;  Surgeon: Georges Gusman MD;  Location: [facility] ENDOSCOPY;  Service:     ENDOSCOPY N/A 12/15/2021    Procedure:  "ESOPHAGOGASTRODUODENOSCOPY WITH BIOPSIES, 54FR LEZAMA DILATATION;  Surgeon: Celso Odonnell MD;  Location:  [facility];  Service: Gastroenterology;  Laterality: N/A;  PRE: DYSPHAGIA  POST: GASTRITIS, IRREGULAR Z LINE, ESOPHAGEAL RING    ENDOSCOPY N/A 5/11/2022    Procedure: ESOPHAGOGASTRODUODENOSCOPY WITH LEZAMA DILATATION 54&56;  Surgeon: Celso Odonnell MD;  Location:  [facility];  Service: Gastroenterology;  Laterality: N/A;  DYSPHAGIA  POST:ESOPHAGEAL RING; ESOPHAGITIS; GASTRITIS; GASTROPARESIS    RECTAL SURGERY      TONSILLECTOMY      TOOTH EXTRACTION  10/15/2018    VENOUS ACCESS DEVICE (PORT) INSERTION AND REMOVAL           Allergies   Allergen Reactions    Oxycodone-Acetaminophen Itching    Promethazine Itching     swelling    Sulfa Antibiotics Anaphylaxis     Throat closes    Neomycin Other (See Comments)     SKIN COMES OFF    Clonazepam Hives and Itching    Duloxetine Hcl Unknown - Low Severity, Other (See Comments) and Unknown (See Comments)     \"made me crazy\"    Niacin GI Intolerance    Pregabalin Other (See Comments), Hallucinations, Itching and Unknown (See Comments)     \"double vision\"         No current facility-administered medications on file prior to encounter.     Current Outpatient Medications on File Prior to Encounter   Medication Sig Dispense Refill    acyclovir (ZOVIRAX) 800 MG tablet TAKE ONE TABLET BY MOUTH THREE TIMES A DAY (Patient taking differently: As Needed.) 30 tablet 4    albuterol (ACCUNEB) 0.63 MG/3ML nebulizer solution Take 3 mL by nebulization Every 6 (Six) Hours As Needed for Wheezing. 300 mL 0    albuterol sulfate  (90 Base) MCG/ACT inhaler Inhale 2 puffs Every 4 (Four) Hours As Needed for Wheezing. 1 inhaler 0    ALPRAZolam (XANAX) 2 MG tablet Take 1 tablet by mouth Daily As Needed for Anxiety. 30 tablet 5    Bictegravir-Emtricitab-Tenofov (BIKTARVY PO) Take 1 tablet/day by mouth Daily.      budesonide-formoterol (SYMBICORT) 160-4.5 MCG/ACT inhaler " Inhale 2 puffs Daily.      dicyclomine (BENTYL) 20 MG tablet Take 1 tablet by mouth Every 6 (Six) Hours As Needed for Abdominal Cramping. 15 tablet 0    diphenhydrAMINE (BENADRYL) 25 mg capsule Take 1 capsule by mouth Every 6 (Six) Hours As Needed for Itching or Allergies. 30 capsule 0    Dulaglutide (Trulicity) 0.75 MG/0.5ML solution pen-injector Inject 0.75 mg under the skin into the appropriate area as directed 1 (One) Time Per Week.      fluocinonide (LIDEX) 0.05 % cream Apply to affected area BID 30 g 1    HYDROcodone-acetaminophen (NORCO)  MG per tablet Take 1 tablet by mouth Every 6 (Six) Hours As Needed for Moderate Pain .      hydrocortisone (ANUSOL-HC) 2.5 % rectal cream Insert  into the rectum.      linaclotide (Linzess) 290 MCG capsule capsule Take 1 capsule by mouth Every Morning Before Breakfast. 30 capsule 11    methylPREDNISolone (MEDROL) 4 MG dose pack Take as directed on package instructions. 21 tablet 0    nystatin (MYCOSTATIN) 100,000 unit/mL suspension Swish and swallow 5 mL 4 (Four) Times a Day. 150 mL 0    ondansetron ODT (ZOFRAN-ODT) 4 MG disintegrating tablet Place 1 tablet on the tongue Every 6 (Six) Hours As Needed for Nausea. 15 tablet 0    ondansetron ODT (ZOFRAN-ODT) 8 MG disintegrating tablet Take 1 tablet by mouth Every 6 (Six) Hours As Needed for Nausea or Vomiting. 30 tablet 0    oxymetazoline (Mucinex Sinus-Max Sinus/Allrgy) 0.05 % nasal spray 2 sprays into the nostril(s) as directed by provider 2 (Two) Times a Day.      pantoprazole (PROTONIX) 40 MG EC tablet Take 1 tablet by mouth As Needed.           Social History     Socioeconomic History    Marital status:    Tobacco Use    Smoking status: Never    Smokeless tobacco: Never   Vaping Use    Vaping status: Never Used   Substance and Sexual Activity    Alcohol use: No    Drug use: No    Sexual activity: Defer         Family History   Problem Relation Age of Onset    Colon cancer Maternal Grandfather     Rey  "Hyperthermia Neg Hx        REVIEW OF SYSTEMS:   Pertinent positives are noted in the HPI above.  Otherwise, all other systems were reviewed, and are negative.     Objective:     Vitals:    07/08/24 0951 07/08/24 1024 07/08/24 1238 07/08/24 1630   BP: (!) 86/67 102/71 102/71 95/63   BP Location:  Left arm  Left arm   Patient Position:  Lying  Lying   Pulse: 67 67  66   Resp:  17  18   Temp: 97.8 °F (36.6 °C) 97.9 °F (36.6 °C)  97.9 °F (36.6 °C)   TempSrc:  Oral  Oral   SpO2: 98% 96%  93%   Weight:  136 kg (299 lb) 136 kg (299 lb)    Height:  182.9 cm (72\") 182.9 cm (72\")      Body mass index is 40.55 kg/m².  Flowsheet Rows      Flowsheet Row First Filed Value   Admission Height 182.9 cm (72\") Documented at 07/08/2024 0733   Admission Weight 132 kg (290 lb) Documented at 07/08/2024 0733             General:    No acute distress, alert and oriented x4, pleasant                   Head:    Normocephalic, atraumatic.  Butterfly like rash under the periorbital regions noted.   Eyes:          Conjunctivae and sclerae normal, no icterus.   Throat:   No oral lesions, no thrush, oral mucosa moist.    Neck:   Supple, trachea midline.   Lungs:     Clear to auscultation bilaterally     Heart:    Regular rhythm and normal rate.  No murmurs, gallops, or rubs noted.   Abdomen:     Soft, non-tender, non-distended, positive bowel sounds.    Extremities:   Trace edema of the left upper extremity and left lower extremity.   Pulses:   Pulses palpable and equal bilaterally.    Skin:   Facial rash noted.  Also rash on arms noted.   Neuro:   Non-focal.  Moves all extremities well.    Psychiatric:   Normal mood and affect.                 Lab Review:                Results from last 7 days   Lab Units 07/08/24  0739   SODIUM mmol/L 139   POTASSIUM mmol/L 3.8   CHLORIDE mmol/L 104   CO2 mmol/L 26.5   BUN mg/dL 13   CREATININE mg/dL 1.23   GLUCOSE mg/dL 103*   CALCIUM mg/dL 9.6     Results from last 7 days   Lab Units 07/08/24  0947 " 07/08/24  0739   HSTROP T ng/L 6 8     Results from last 7 days   Lab Units 07/08/24  0739   WBC 10*3/mm3 6.49   HEMOGLOBIN g/dL 17.2   HEMATOCRIT % 50.9   PLATELETS 10*3/mm3 165     Results from last 7 days   Lab Units 07/08/24  0801   INR  1.10     Results from last 7 days   Lab Units 07/08/24  0739   CHOLESTEROL mg/dL 167         Results from last 7 days   Lab Units 07/08/24  0739   CHOLESTEROL mg/dL 167   TRIGLYCERIDES mg/dL 134   HDL CHOL mg/dL 32*   LDL CHOL mg/dL 111*       EKG (reviewed by me personally):        Assessment:   1.  Diffuse joint pain with multiple other rheumatological symptoms.  Constellation is concerning for connective tissue disease exacerbation, most likely systemic lupus.  2.  Chest pain, sharp  3.  HIV, on suppression therapy  4.  Recent intentional weight loss  5.  History of diabetes, prior to #4   6.  History of rectal cancer, status postchemotherapy and radiation  7.  History of recurrent shingles  8.  Obstructive sleep apnea    Plan:       The most concerning issue for me is the constellation of findings that strongly suggest she is having a flare of a connective tissue disease.  Based on the appearance of his malar rash, the photosensitivity, diffuse joint aching, and other symptoms, this is most likely systemic lupus.      I suspect that his chest discomfort may be more related to this.  His workup thus far has been unremarkable.  His EKG does not show any significant changes consistent with ischemia.  His troponin is negative.  Because of the sharp nature of the pain, I obtained an echocardiogram to assess for pericardial effusion.  He does not have a pericardial effusion or any evidence of other significant structural heart disease.  His ejection fraction was 65 to 70% with normal wall motion.    I ordered IV Solu-Medrol 80 mg when he first arrived.  It appears he is going to get more IV Solu-Medrol now.  I think this will help the more than anything, especially with symptoms.   I will reassess him in the morning.  If he is still having significant chest discomfort, I would consider stress testing.  However, I am leaning against that currently.    Thank you very much for this consult.    Enzo Jimenez MD

## 2024-07-08 NOTE — FSED PROVIDER NOTE
"Subjective   History of Present Illness  53yo male pmh significant HIV/squamous cell anal ca/jesika/dm2/fatty liver presents ED c/o 3d hx intermittent left sided chest pain characterized as \"pressure\"/radiating LUE, neck/associated soa, nausea/negative exacerbating or relieve factors; pt rates chest pain 6/10 at time of exam.  ROS (+) left upper extremity swelling as well as left calf discomfort.    History provided by:  Patient  Chest Pain  Associated symptoms: nausea and shortness of breath    Associated symptoms: no abdominal pain, no cough and no palpitations        Review of Systems   Constitutional: Negative.    HENT: Negative.     Eyes: Negative.    Respiratory:  Positive for shortness of breath. Negative for cough.    Cardiovascular:  Positive for chest pain. Negative for palpitations and leg swelling.   Gastrointestinal:  Positive for nausea. Negative for abdominal pain.   Genitourinary: Negative.    Musculoskeletal:  Positive for myalgias.   Allergic/Immunologic: Positive for immunocompromised state.   Neurological:  Negative for syncope.   All other systems reviewed and are negative.      Past Medical History:   Diagnosis Date    Anesthesia complication     ET TUBE CUFF WASN'T FULLY DEFLATED UPON REMOVAL AFTER GALLBLADDER SURGERY.  REQUIRED HOSPITALIZATION X1 WEEK AFTERWARDS    Anxiety     Diabetes mellitus     Disease of thyroid gland     Elevated triglycerides with high cholesterol     Fatty liver     Hard to intubate     WITH GALLBLADDER SURGERY    Heart palpitations     History of chemotherapy 2019    History of radiation therapy     HIV disease     On home oxygen therapy     3L NC AT NIGHT    Rectal cancer     Shingles     RECURRING, AT LEAST 8 TIMES    Slow gastric motility     Slow to wake up after anesthesia        Allergies   Allergen Reactions    Oxycodone-Acetaminophen Itching    Promethazine Itching     swelling    Sulfa Antibiotics Anaphylaxis     Throat closes    Neomycin Other (See Comments) " "    SKIN COMES OFF    Clonazepam Hives and Itching    Duloxetine Hcl Unknown - Low Severity, Other (See Comments) and Unknown (See Comments)     \"made me crazy\"    Niacin GI Intolerance    Pregabalin Other (See Comments), Hallucinations, Itching and Unknown (See Comments)     \"double vision\"       Past Surgical History:   Procedure Laterality Date    AXILLARY LYMPH NODE BIOPSY/EXCISION Left     CHOLECYSTECTOMY  2002    COLONOSCOPY N/A 07/08/2016    Procedure: COLONOSCOPY TO CECUM;  Surgeon: Georges Gusman MD;  Location[facility] END[facility];  Service:     COLONOSCOPY N/A 12/15/2021    Procedure: COLONOSCOPY INTO CECUM AND TI WITH BIOPSIES;  Surgeon: Celso Odonnell MD;  Location: [facility] ENDOSCOPY;  Service: Gastroenterology;  Laterality: N/A;  PRE: FECAL INCONTINENCE  POST: DECREASED VASCULARITY    ENDOSCOPY N/A 07/08/2016    Procedure: ESOPHAGOGASTRODUODENOSCOPY WITH COLD BIOPSIES;  Surgeon: Georges Gusman MD;  Location: [facility] ENDOSCOPY;  Service:     ENDOSCOPY N/A 12/15/2021    Procedure: ESOPHAGOGASTRODUODENOSCOPY WITH BIOPSIES, 54FR LEZAMA DILATATION;  Surgeon: Celso Odonnell MD;  Location: [facility] ENDOSCOPY;  Service: Gastroenterology;  Laterality: N/A;  PRE: DYSPHAGIA  POST: GASTRITIS, IRREGULAR Z LINE, ESOPHAGEAL RING    ENDOSCOPY N/A 5/11/2022    Procedure: ESOPHAGOGASTRODUODENOSCOPY WITH LEZAMA DILATATION 54&56;  Surgeon: Celso Odonnell MD;  Location: [facility] ENDOSCOPY;  Service: Gastroenterology;  Laterality: N/A;  DYSPHAGIA  POST:ESOPHAGEAL RING; ESOPHAGITIS; GASTRITIS; GASTROPARESIS    RECTAL SURGERY      TONSILLECTOMY      TOOTH EXTRACTION  10/15/2018    VENOUS ACCESS DEVICE (PORT) INSERTION AND REMOVAL         Family History   Problem Relation Age of Onset    Colon cancer Maternal Grandfather     Malig Hyperthermia Neg Hx        Social History     Socioeconomic History    Marital status:    Tobacco Use    Smoking status: Never    Smokeless tobacco: Never   Vaping Use    Vaping status: " Never Used   Substance and Sexual Activity    Alcohol use: No    Drug use: No    Sexual activity: Defer           Objective   Physical Exam  Vitals and nursing note reviewed.   HENT:      Head: Normocephalic and atraumatic.      Right Ear: External ear normal.      Left Ear: External ear normal.      Nose: Nose normal.      Mouth/Throat:      Mouth: Mucous membranes are moist.      Pharynx: Oropharynx is clear.   Eyes:      Pupils: Pupils are equal, round, and reactive to light.   Cardiovascular:      Rate and Rhythm: Normal rate.      Pulses: Normal pulses.      Heart sounds: Normal heart sounds. No murmur heard.     No friction rub. No gallop.   Pulmonary:      Effort: Pulmonary effort is normal.      Breath sounds: Normal breath sounds. No wheezing, rhonchi or rales.   Abdominal:      General: Bowel sounds are normal. There is no distension.      Palpations: Abdomen is soft.      Tenderness: There is no abdominal tenderness. There is no guarding or rebound.   Musculoskeletal:         General: Tenderness present.        Arms:       Cervical back: Normal range of motion and neck supple. No rigidity.   Lymphadenopathy:      Cervical: No cervical adenopathy.   Skin:     General: Skin is warm and dry.   Neurological:      General: No focal deficit present.      Mental Status: He is alert and oriented to person, place, and time.      GCS: GCS eye subscore is 4. GCS verbal subscore is 5. GCS motor subscore is 6.         ECG 12 Lead      Date/Time: 7/8/2024 8:35 AM    Performed by: Evangelista Redman MD  Authorized by: Evangelista Redman MD  Interpreted by ED physician  Rhythm: sinus rhythm  Rate: normal  BPM: 81  QRS axis: normal  Conduction: conduction normal  ST Segments: ST segments normal  T Waves: T waves normal  Other: no other findings  Clinical impression: normal ECG               ED Course  ED Course as of 07/08/24 1349   Mon Jul 08, 2024   0843 D/w [facility] ED observation accepting admit for Dr. Lyn.  Pt stable  transport. [SD]      ED Course User Index  [SD] Evangelista Redman MD      Labs Reviewed   COMPREHENSIVE METABOLIC PANEL - Abnormal; Notable for the following components:       Result Value    Glucose 103 (*)     All other components within normal limits    Narrative:     GFR Normal >60  Chronic Kidney Disease <60  Kidney Failure <15     CBC WITH AUTO DIFFERENTIAL - Abnormal; Notable for the following components:    Immature Grans % 0.8 (*)     All other components within normal limits   TROPONIN - Normal    Narrative:     High Sensitive Troponin T Reference Range:  <14.0 ng/L- Negative Female for AMI  <22.0 ng/L- Negative Male for AMI  >=14 - Abnormal Female indicating possible myocardial injury.  >=22 - Abnormal Male indicating possible myocardial injury.   Clinicians would have to utilize clinical acumen, EKG, Troponin, and serial changes to determine if it is an Acute Myocardial Infarction or myocardial injury due to an underlying chronic condition.        BNP (IN-HOUSE) - Normal    Narrative:     This assay is used as an aid in the diagnosis of individuals suspected of having heart failure. It can be used as an aid in the diagnosis of acute decompensated heart failure (ADHF) in patients presenting with signs and symptoms of ADHF to the emergency department (ED). In addition, NT-proBNP of <300 pg/mL indicates ADHF is not likely.    Age Range Result Interpretation  NT-proBNP Concentration (pg/mL:      <50             Positive            >450                   Gray                 300-450                    Negative             <300    50-75           Positive            >900                  Gray                300-900                  Negative            <300      >75             Positive            >1800                  Gray                300-1800                  Negative            <300   D-DIMER, QUANTITATIVE - Normal    Narrative:     According to the assay 's published package insert, a normal  "(<0.50 MCGFEU/mL) D-dimer result in conjunction with a non-high clinical probability assessment, excludes deep vein thrombosis (DVT) and pulmonary embolism (PE) with high sensitivity.    D-dimer values increase with age and this can make VTE exclusion of an older population difficult. To address this, the [employer] College [employer], based on best available evidence and recent guidelines, recommends that clinicians use age-adjusted D-dimer thresholds in patients greater than 50 years of age with: a) a low probability of PE who do not meet all Pulmonary Embolism Rule Out Criteria, or b) in those with intermediate probability of PE.   The formula for an age-adjusted D-dimer cut-off is \"age/100\".  For example, a 60 year old patient would have an age-adjusted cut-off of 0.60 MCGFEU/mL and an 80 year old 0.80 MCGFEU/mL.   HIGH SENSITIVITIY TROPONIN T 2HR - Normal    Narrative:     High Sensitive Troponin T Reference Range:  <14.0 ng/L- Negative Female for AMI  <22.0 ng/L- Negative Male for AMI  >=14 - Abnormal Female indicating possible myocardial injury.  >=22 - Abnormal Male indicating possible myocardial injury.   Clinicians would have to utilize clinical acumen, EKG, Troponin, and serial changes to determine if it is an Acute Myocardial Infarction or myocardial injury due to an underlying chronic condition.        URINALYSIS W/ MICROSCOPIC IF INDICATED (NO CULTURE) - Normal    Narrative:     Urine microscopic not indicated.   LAB PROTIME-INR, FINGERSTICK   LIPID PANEL   HEMOGLOBIN A1C   KAREL   ANTI-DNA ANTIBODY, DOUBLE-STRANDED   SEDIMENTATION RATE   C-REACTIVE PROTEIN   RHEUMATOID FACTOR, QUANT   POCT PROTIME - INR   POCT GLUCOSE FINGERSTICK   POCT GLUCOSE FINGERSTICK   POCT GLUCOSE FINGERSTICK   POCT GLUCOSE FINGERSTICK   POCT GLUCOSE FINGERSTICK   CBC AND DIFFERENTIAL    Narrative:     The following orders were created for panel order CBC & Differential.  Procedure                               Abnormality       "   Status                     ---------                               -----------         ------                     CBC Auto Differential[[id number]]        Abnormal            Final result                 Please view results for these tests on the individual orders.     Adult Transthoracic Echo Complete W/ Cont if Necessary Per Protocol    Result Date: 7/8/2024  Narrative:   Left ventricular systolic function is normal. Left ventricular ejection fraction appears to be 66 - 70%.   Left ventricular diastolic function was normal.   Normal right ventricular cavity size and systolic function noted.   The left atrial cavity is mildly dilated.   Trace to mild tricuspid valve regurgitation is present.   Calculated right ventricular systolic pressure from tricuspid regurgitation is 30 mmHg.   There is no evidence of pericardial effusion.     XR Chest 1 View    Result Date: 7/8/2024  Narrative: CHEST SINGLE VIEW  HISTORY: Chest pain and pressure.  COMPARISON: None.  FINDINGS: Cardiomediastinal silhouette is within normal limits. Lungs appear clear and there is no evidence for pulmonary edema or pleural effusion or infiltrate. Cardiac monitoring leads are present.      Impression: No evidence for active disease in the chest.  This report was finalized on 7/8/2024 8:11 AM by Dr. Russell Jerome M.D on Workstation: [id number]               HEART Score: 4                  HEART Score for Major Cardiac Events - MDCalc  Calculated on Jul 08 2024 8:37 AM  4 points -> Moderate Score (4-6 points) Risk of MACE of 12-16.6%.          Medical Decision Making  Problems Addressed:  Chest pain, unspecified type: complicated acute illness or injury    Amount and/or Complexity of Data Reviewed  Labs: ordered.  Radiology: ordered.  ECG/medicine tests: ordered and independent interpretation performed.    Risk  OTC drugs.  Prescription drug management.  Decision regarding hospitalization.        Final diagnoses:   Chest pain, unspecified type        ED Disposition  ED Disposition       ED Disposition   Decision to Admit    Condition   --    Comment   Level of Care: Observation Unit [28]   Diagnosis: Chest pain [612028]   Admitting Physician: ALEXANDRU TRACEY [[id number]]   Attending Physician: ALEXANDRU TRACEY [[id number]]                 No follow-up provider specified.       Medication List      No changes were made to your prescriptions during this visit.

## 2024-07-08 NOTE — H&P
"[facility] [place]YSICAL    Patient Name: Al Peña  : 1970  MRN: [id number]  Primary Care Physician:  Rene Davis MD  Date of admission: 2024    Subjective   Subjective     Chief Complaint:   Chief Complaint   Patient presents with    Chest Pain         HPI:    Al Peña is a 54 y.o. male with past medical history of HIV, DM2, hypertension, hyperlipidemia, KARIN on 3 L nasal cannula nightly, hypothyroidism, history of rectal cancer status postchemotherapy and radiation, history of recurrent shingles, and obesity who is admitted for chest pain rule out.  Patient reports 3-day history of intermittent left-sided chest pain described as \"sharp and stabbing\" that is very briefly in duration and improves on its own but today it was a \"pressure\" type of pain and associated with nausea, dyspnea, and mild diaphoresis. Patient reports that it sometimes radiates to the neck and the right side of his chest. Nothing seems to make it better or worse. Patient tried his inhaler this morning given the new associated symptoms but this did not improve the issues. Patient does also report some left upper extremity swelling as well as left calf discomfort.  Patient denies missing any medications.  Patient reports that he is recently lost a significant amount of weight intentionally over the last year and has been trying to eat better.  Patient reports that he has not been on blood pressure medicine or cholesterol medicine, and his diabetes has been well-controlled on Mounjaro and lifestyle changes, reporting his last A1c was 4.  Patient does report a significant maternal family medical history of general cardiac disease with his mother as well as multiple maternal uncles that have all  in their mid 50s from heart disease.    Patient also reports that he got the shingles vaccination 1 week ago, and he had bilateral upper extremity tingling following the injection, then about 3 days " afterwards his left-sided chest pain started.  Patient also reports that he has a rash on his face, that his oncologist and general practitioner are concerned that he has lupus and he has been referred to rheumatologist and is waiting an appointment with them for further evaluation and diagnosis.  Patient reports that since the vaccination he has had significant joint pain of all extremities.  Patient is very concerned that he is having a lupus flareup at this time secondary to the recent shingles vaccination.    Review of Systems   All systems were reviewed and negative except for: Chest pain, nausea, dyspnea, left arm swelling    Personal History     Past Medical History:   Diagnosis Date    Anesthesia complication     ET TUBE CUFF WASN'T FULLY DEFLATED UPON REMOVAL AFTER GALLBLADDER SURGERY.  REQUIRED HOSPITALIZATION X1 WEEK AFTERWARDS    Anxiety     Diabetes mellitus     Disease of thyroid gland     Elevated triglycerides with high cholesterol     Fatty liver     Hard to intubate     WITH GALLBLADDER SURGERY    Heart palpitations     History of chemotherapy 2019    History of radiation therapy     HIV disease     On home oxygen therapy     3L NC AT NIGHT    Rectal cancer     Shingles     RECURRING, AT LEAST 8 TIMES    Slow gastric motility     Slow to wake up after anesthesia        Past Surgical History:   Procedure Laterality Date    AXILLARY LYMPH NODE BIOPSY/EXCISION Left     CHOLECYSTECTOMY  2002    COLONOSCOPY N/A 07/08/2016    Procedure: COLONOSCOPY TO CECUM;  Surgeon: Georges Gusman MD;  Location: [facility] ENDOSCOPY;  Service:     COLONOSCOPY N/A 12/15/2021    Procedure: COLONOSCOPY INTO CECUM AND TI WITH BIOPSIES;  Surgeon: Celso Odonnell MD;  Location: [facility] ENDOSCOPY;  Service: Gastroenterology;  Laterality: N/A;  PRE: FECAL INCONTINENCE  POST: DECREASED VASCULARITY    ENDOSCOPY N/A 07/08/2016    Procedure: ESOPHAGOGASTRODUODENOSCOPY WITH COLD BIOPSIES;  Surgeon: Georges Gusman MD;  Location:   "[facility] ENDOSCOPY;  Service:     ENDOSCOPY N/A 12/15/2021    Procedure: ESOPHAGOGASTRODUODENOSCOPY WITH BIOPSIES, 54FR LEZAMA DILATATION;  Surgeon: Celso Odonnell MD;  Location:  [facility]OSCOPY;  Service: Gastroenterology;  Laterality: N/A;  PRE: DYSPHAGIA  POST: GASTRITIS, IRREGULAR Z LINE, ESOPHAGEAL RING    ENDOSCOPY N/A 5/11/2022    Procedure: ESOPHAGOGASTRODUODENOSCOPY WITH LEZAMA DILATATION 54&56;  Surgeon: Celso Odonnell MD;  Location:  [facility]OSCOPY;  Service: Gastroenterology;  Laterality: N/A;  DYSPHAGIA  POST:ESOPHAGEAL RING; ESOPHAGITIS; GASTRITIS; GASTROPARESIS    RECTAL SURGERY      TONSILLECTOMY      TOOTH EXTRACTION  10/15/2018    VENOUS ACCESS DEVICE (PORT) INSERTION AND REMOVAL         Family History: family history includes Colon cancer in his maternal grandfather. Otherwise pertinent FHx was reviewed and not pertinent to current issue.    Social History:  reports that he has never smoked. He has never used smokeless tobacco. He reports that he does not drink alcohol and does not use drugs.    Home Medications:  ALPRAZolam, Bictegravir-Emtricitab-Tenofov, Dulaglutide, HYDROcodone-acetaminophen, acyclovir, albuterol, albuterol sulfate HFA, budesonide-formoterol, dicyclomine, diphenhydrAMINE, fluocinonide, hydrocortisone, linaclotide, methylPREDNISolone, nystatin, ondansetron ODT, oxymetazoline, and pantoprazole    Allergies:  Allergies   Allergen Reactions    Oxycodone-Acetaminophen Itching    Promethazine Itching     swelling    Sulfa Antibiotics Anaphylaxis     Throat closes    Neomycin Other (See Comments)     SKIN COMES OFF    Clonazepam Hives and Itching    Duloxetine Hcl Unknown - Low Severity, Other (See Comments) and Unknown (See Comments)     \"made me crazy\"    Niacin GI Intolerance    Pregabalin Other (See Comments), Hallucinations, Itching and Unknown (See Comments)     \"double vision\"       Objective   Objective     Vitals:   Temp:  [97.7 °F (36.5 °C)] 97.7 °F (36.5 °C)  Heart " Rate:  [70-79] 73  Resp:  [20-24] 20  BP: ()/(57-84) 86/57  Physical Exam    Constitutional: Awake, alert, no acute distress, sitting up in bed, overall appears well-groomed   Eyes: PERRL, sclerae anicteric, no conjunctival injection, EOMI   HENT: NCAT, mucous membranes moist, normal hearing   Neck: Supple, nontender, trachea midline   Respiratory: Clear to auscultation bilaterally, nonlabored respirations on room air   Cardiovascular: RRR, no murmurs, chest pain is not reproducible on exam, palpable pedal pulses bilaterally   Gastrointestinal: Positive bowel sounds, soft, nontender, nondistended, obese abdomen   Musculoskeletal: No bilateral ankle edema, no clubbing or cyanosis to extremities; patient does have swelling of the left upper extremity mostly at hand and forearm associated with mild erythema and tenderness overlying joints.  Patient also has tenderness at left calf.    Psychiatric: Appropriate affect, cooperative, anxious   Neurologic: Oriented x 3, strength symmetric in all extremities, Cranial Nerves grossly intact to confrontation, speech clear   Skin: Erythema on bilateral cheeks and bridge of nose consistent with Malar/butterfly rash    Result Review    Result Review:  I have personally reviewed the results from the time of this admission to 7/8/2024 08:57 EDT and agree with these findings:  [x]  Laboratory list / accordion  []  Microbiology  [x]  Radiology  [x]  EKG/Telemetry   []  Cardiology/Vascular   []  Pathology  []  Old records  []  Other:  Most notable findings include: Troponin negative x 2, proBNP 36, potassium 3.8, serum creatinine 1.23, AST and ALT within normal limits, D-dimer 0.37, INR 1.1, WBC 6.49, hemoglobin 17.2.  Chest x-ray negative for any acute cardiopulmonary issues.  EKG negative for any acute ischemic changes, does appear to be sinus rhythm.      Assessment & Plan   Assessment / Plan     Brief Patient Summary:  Al Peña is a 54 y.o. male who is admitted for  chest pain rule out    Active Hospital Problems:  Active Hospital Problems    Diagnosis     **Chest pain      Plan:   Chest pain:  -Initial troponin negative, continue to trend  -D-dimer negative  -EKG reviewed no acute ischemic changes appreciated  -Continuous cardiac monitor  -Chest x-ray negative  -Echo pending  -Cardiology consulted  -[facility], Daily aspirin ordered  -Lipid and A1c pending    Left lower extremity calf pain/left upper extremity swelling:  -Ultrasounds pending with upper and lower extremities  -D-dimer negative    Facial rash and generalized joint pain:  -Patient reports that he has an appointment in August to follow-up with rheumatology for further evaluation and diagnostic testing for lupus  - Ordering KAREL, Anti-ds DNA, RF, ESR/CRP testing as well as UA to monitor for proteinuria    HIV/AIDS  History of recurrent shingles:  -Continue home regimen  -No current shingle outbreak appreciated; did receive shingles vaccination 1 week ago    DM 2:  -SSI ordered  -A1c pending    Chronic hypertension  Chronic hyperlipidemia:  -Continue home regimen    KARIN:  -Wears 3 L nasal cannula nightly, okay to continue    Chronic hypothyroidism:  -Continue home regimen    Obesity:  -BMI 39.33, diet and exercise encouraged    VTE Prophylaxis:  Mechanical VTE prophylaxis orders are present.        CODE STATUS:    Level Of Support Discussed With: Patient  Code Status (Patient has no pulse and is not breathing): CPR (Attempt to Resuscitate)  Medical Interventions (Patient has pulse or is breathing): Full Support    Admission Status:  I believe this patient meets observation status.    Electronically signed by Taylor Liang PA-C, 07/08/24, 8:57 AM EDT.        75 minutes has been spent by [facility] providers in the care of this patient while under observation status      I have worn appropriate PPE during this patient encounter, sanitized my hands both with  entering and exiting patient's room.    I have discussed plan of care with patient including advance care plan and/or surrogate decision maker.  Patient advises that their  Tunde will be their primary surrogate decision maker

## 2024-07-09 VITALS
DIASTOLIC BLOOD PRESSURE: 60 MMHG | HEART RATE: 68 BPM | WEIGHT: 299 LBS | BODY MASS INDEX: 40.5 KG/M2 | HEIGHT: 72 IN | SYSTOLIC BLOOD PRESSURE: 108 MMHG | TEMPERATURE: 97.5 F | OXYGEN SATURATION: 99 % | RESPIRATION RATE: 18 BRPM

## 2024-07-09 LAB
ANA SER QL: POSITIVE
ANION GAP SERPL CALCULATED.3IONS-SCNC: 10.7 MMOL/L (ref 5–15)
BUN SERPL-MCNC: 15 MG/DL (ref 6–20)
BUN/CREAT SERPL: 15 (ref 7–25)
CALCIUM SPEC-SCNC: 9.3 MG/DL (ref 8.6–10.5)
CHLORIDE SERPL-SCNC: 106 MMOL/L (ref 98–107)
CO2 SERPL-SCNC: 21.3 MMOL/L (ref 22–29)
CREAT SERPL-MCNC: 1 MG/DL (ref 0.76–1.27)
DEPRECATED RDW RBC AUTO: 42.3 FL (ref 37–54)
DSDNA AB SER-ACNC: <1 IU/ML (ref 0–9)
EGFRCR SERPLBLD CKD-EPI 2021: 90 ML/MIN/1.73
ERYTHROCYTE [DISTWIDTH] IN BLOOD BY AUTOMATED COUNT: 13 % (ref 12.3–15.4)
GLUCOSE BLDC GLUCOMTR-MCNC: 150 MG/DL (ref 70–130)
GLUCOSE BLDC GLUCOMTR-MCNC: 185 MG/DL (ref 70–130)
GLUCOSE SERPL-MCNC: 143 MG/DL (ref 65–99)
HCT VFR BLD AUTO: 46.5 % (ref 37.5–51)
HGB BLD-MCNC: 16 G/DL (ref 13–17.7)
Lab: NORMAL
MAGNESIUM SERPL-MCNC: 2.3 MG/DL (ref 1.6–2.6)
MCH RBC QN AUTO: 30.7 PG (ref 26.6–33)
MCHC RBC AUTO-ENTMCNC: 34.4 G/DL (ref 31.5–35.7)
MCV RBC AUTO: 89.3 FL (ref 79–97)
PLATELET # BLD AUTO: 155 10*3/MM3 (ref 140–450)
PMV BLD AUTO: 9.4 FL (ref 6–12)
POTASSIUM SERPL-SCNC: 4.6 MMOL/L (ref 3.5–5.2)
QT INTERVAL: 358 MS
QT INTERVAL: 373 MS
QTC INTERVAL: 397 MS
QTC INTERVAL: 416 MS
RBC # BLD AUTO: 5.21 10*6/MM3 (ref 4.14–5.8)
SODIUM SERPL-SCNC: 138 MMOL/L (ref 136–145)
TROPONIN T SERPL HS-MCNC: 7 NG/L
WBC NRBC COR # BLD AUTO: 7.95 10*3/MM3 (ref 3.4–10.8)

## 2024-07-09 PROCEDURE — 83735 ASSAY OF MAGNESIUM: CPT | Performed by: EMERGENCY MEDICINE

## 2024-07-09 PROCEDURE — 80048 BASIC METABOLIC PNL TOTAL CA: CPT | Performed by: EMERGENCY MEDICINE

## 2024-07-09 PROCEDURE — 25010000002 METHYLPREDNISOLONE PER 125 MG: Performed by: EMERGENCY MEDICINE

## 2024-07-09 PROCEDURE — 96376 TX/PRO/DX INJ SAME DRUG ADON: CPT

## 2024-07-09 PROCEDURE — 82948 REAGENT STRIP/BLOOD GLUCOSE: CPT

## 2024-07-09 PROCEDURE — 99214 OFFICE O/P EST MOD 30 MIN: CPT | Performed by: INTERNAL MEDICINE

## 2024-07-09 PROCEDURE — 85027 COMPLETE CBC AUTOMATED: CPT | Performed by: EMERGENCY MEDICINE

## 2024-07-09 PROCEDURE — 25010000002 KETOROLAC TROMETHAMINE PER 15 MG

## 2024-07-09 PROCEDURE — 84484 ASSAY OF TROPONIN QUANT: CPT

## 2024-07-09 PROCEDURE — G0378 HOSPITAL OBSERVATION PER HR: HCPCS

## 2024-07-09 RX ORDER — PREDNISONE 10 MG/1
TABLET ORAL
Qty: 70 TABLET | Refills: 0 | Status: SHIPPED | OUTPATIENT
Start: 2024-07-09 | End: 2024-08-06

## 2024-07-09 RX ORDER — KETOROLAC TROMETHAMINE 15 MG/ML
15 INJECTION, SOLUTION INTRAMUSCULAR; INTRAVENOUS ONCE
Status: COMPLETED | OUTPATIENT
Start: 2024-07-09 | End: 2024-07-09

## 2024-07-09 RX ADMIN — METHYLPREDNISOLONE SODIUM SUCCINATE 60 MG: 125 INJECTION INTRAMUSCULAR; INTRAVENOUS at 03:50

## 2024-07-09 RX ADMIN — METHYLPREDNISOLONE SODIUM SUCCINATE 60 MG: 125 INJECTION INTRAMUSCULAR; INTRAVENOUS at 10:52

## 2024-07-09 RX ADMIN — Medication 10 ML: at 09:50

## 2024-07-09 RX ADMIN — KETOROLAC TROMETHAMINE 15 MG: 15 INJECTION, SOLUTION INTRAMUSCULAR; INTRAVENOUS at 06:41

## 2024-07-09 RX ADMIN — ASPIRIN 81 MG: 81 TABLET, COATED ORAL at 10:43

## 2024-07-09 RX ADMIN — PANTOPRAZOLE SODIUM 40 MG: 40 INJECTION, POWDER, FOR SOLUTION INTRAVENOUS at 06:19

## 2024-07-09 RX ADMIN — HYDROCODONE BITARTRATE AND ACETAMINOPHEN 1 TABLET: 10; 325 TABLET ORAL at 04:00

## 2024-07-09 NOTE — DISCHARGE SUMMARY
"ED OBSERVATION PROGRESS/DISCHARGE SUMMARY    Date of Admission: 2024   LOS: 0 days   PCP: Rene Davis MD    Final Diagnosis chest pain      Subjective     Hospital Outcome: Al Peña is a 54 y.o. male with past medical history of HIV, DM2, hypertension, hyperlipidemia, KARIN on 3 L nasal cannula nightly, hypothyroidism, history of rectal cancer status postchemotherapy and radiation, history of recurrent shingles, and obesity who is admitted for chest pain rule out.  Patient reports 3-day history of intermittent left-sided chest pain described as \"sharp and stabbing\" that is very briefly in duration and improves on its own but today it was a \"pressure\" type of pain and associated with nausea, dyspnea, and mild diaphoresis. Patient reports that it sometimes radiates to the neck and the right side of his chest. Nothing seems to make it better or worse. Patient tried his inhaler this morning given the new associated symptoms but this did not improve the issues. Patient does also report some left upper extremity swelling as well as left calf discomfort.  Patient denies missing any medications.  Patient reports that he is recently lost a significant amount of weight intentionally over the last year and has been trying to eat better.  Patient reports that he has not been on blood pressure medicine or cholesterol medicine, and his diabetes has been well-controlled on Mounjaro and lifestyle changes, reporting his last A1c was 4.  Patient does report a significant maternal family medical history of general cardiac disease with his mother as well as multiple maternal uncles that have all  in their mid 50s from heart disease.     Patient also reports that he got the shingles vaccination 1 week ago, and he had bilateral upper extremity tingling following the injection, then about 3 days afterwards his left-sided chest pain started.  Patient also reports that he has a rash on his face, that his oncologist and " general practitioner are concerned that he has lupus and he has been referred to rheumatologist and is waiting an appointment with them for further evaluation and diagnosis.  Patient reports that since the vaccination he has had significant joint pain of all extremities.  Patient is very concerned that he is having a lupus flareup at this time secondary to the recent shingles vaccination.    7/9/2024: Pain improving with steroids. Cardiology reevaluated the patient and no further inpatient cardiac testing indicated at this time; they will establish outpatient follow-up with the patient.  I personally called and talked with office staff of the patient's rheumatologist with [facility], there are no sooner appointments for the patient but the physician will review the chart and if needed they will attempt to work him in sooner.  Will discharge patient home on an extended steroid taper till patient can see rheumatology.  All labs and imaging findings discussed with patient and he is agreeable for discharge home at this time.    ROS:  Review of Systems   Constitutional:  No weight changes, fever, or chills. No night sweats, no fatigue, no malaise.    ENT/Mouth:  No hearing changes, no ear pain, no nasal congestion, no sinus pain, no hoarseness, no sore throat, no rhinorrhea, no dysphagia.   Eyes:  No eye pain, swelling, or redness. No foreign body, discharge. No vision changes.    Cardiovascular:  No shortness of air, no dyspnea on exertion, no orthopnea, no palpitations, no edema.   + Chest pain   Respiratory:  No cough, no smoke exposure, no dyspnea.    Gastrointestinal:  No nausea, vomiting, or diarrhea. No constipation, or GI discomfort. No reflux pain, no anorexia, no dysphagia. No hematochezia or melena.    Genitourinary:  No dyspareunia, no dysuria, no urinary frequency. No hematuria, no urinary incontinence. No flank pain or urinary flow changes.   Musculoskeletal:  No back or neck pain, no recent injuries. +  Generalized joint pain worse in lower extremities.   Skin:  No skin lesions, no pruritus, no hair changes.    Neuro:  No weakness, no numbness, no paresthesias, no loss of consciousness, no syncope, no dizziness, no headache.   Psych:  No anxiety/panic, no depression, no insomnia, no personality changes, no delusions.    Heme/Lymph:  No bruising, or bleeding. No transfusions history, no lymphadenopathy.   Endocrine:  No polyuria, polydipsia, no temperature intolerances.     Objective   Physical Exam:   Constitutional: Awake, alert. Well developed for age. Nontoxic appearing.   Eyes: PERRL x2, sclerae anicteric, no conjunctival injection.  EOMI  HENT: NCAT, mucous membranes moist,   Neck: Supple, no thyromegaly, no lymphadenopathy, trachea midline  Respiratory: Clear to auscultation bilaterally, nonlabored respirations   Cardiovascular: RRR, no murmurs, rubs, or gallops, palpable pedal pulses bilaterally. No appreciable edema.   Gastrointestinal: Positive bowel sounds, soft, nontender, not distended.   Musculoskeletal: No bilateral ankle edema, no clubbing or cyanosis to extremities. No obvious deformities.   Psychiatric: Appropriate affect, cooperative. Converses appropriately for age.   Neurologic: Oriented x 3, strength symmetric in all extremities. Cranial nerves grossly intact to confrontation, speech clear  Skin: No rashes, skin intact.     Results Review:    I have reviewed the labs, radiology results and diagnostic studies.    Results from last 7 days   Lab Units 07/08/24  0739   WBC 10*3/mm3 6.49   HEMOGLOBIN g/dL 17.2   HEMATOCRIT % 50.9   PLATELETS 10*3/mm3 165     Results from last 7 days   Lab Units 07/08/24  0739   SODIUM mmol/L 139   POTASSIUM mmol/L 3.8   CHLORIDE mmol/L 104   CO2 mmol/L 26.5   BUN mg/dL 13   CREATININE mg/dL 1.23   CALCIUM mg/dL 9.6   BILIRUBIN mg/dL 1.0   ALK PHOS U/L 69   ALT (SGPT) U/L 36   AST (SGOT) U/L 23   GLUCOSE mg/dL 103*     Imaging Results (Last 24 Hours)       Procedure  Component Value Units Date/Time    XR Chest 1 View [[id number]] Collected: 07/08/24 0804     Updated: 07/08/24 0814    Narrative:      CHEST SINGLE VIEW     HISTORY: Chest pain and pressure.     COMPARISON: None.     FINDINGS: Cardiomediastinal silhouette is within normal limits. Lungs  appear clear and there is no evidence for pulmonary edema or pleural  effusion or infiltrate. Cardiac monitoring leads are present.       Impression:      No evidence for active disease in the chest.     This report was finalized on 7/8/2024 8:11 AM by Dr. Russell Jerome M.D on Workstation: [id number]               I have reviewed the medications.  ---------------------------------------------------------------------------------------------  Assessment & Plan   Assessment/Problem List    Chest pain    Plan:    Chest pain:  -Initial troponin negative, continue to trend  -D-dimer negative  -EKG reviewed no acute ischemic changes appreciated  -Continuous cardiac monitor  -Chest x-ray negative  -TTE: Mild LA dilation, trace to mild tricuspid regurgitation  -Continue daily aspirin  -Cardiology consultation  -Chest pain improved, left side of thoracic cage is tender, cardiology recommendation include IV Solu-Medrol due to high suspicion for connective tissue disease.    -Patient can follow-up further with cardiology outpatient, no further inpatient workup indicated at this time.     Left lower extremity calf pain/left upper extremity swelling:  -Ultrasounds upper extremity showed chronic thrombophlebitis but no acute clot  -Ultrasound of left lower extremity negative for any acute issues  -D-dimer negative     Facial rash and generalized joint pain:  -Concern for lupus, referred to rheumatology by PCP, appointment in August  -CRP 0.74, RF 31.0, KAREL positive, UA negative for proteins  -Anti-dsDNA antibody pending  -Called and left a message with patient's upcoming rheumatology office to discuss possible discharge medication options to  help patient manage until he gets into the office in 1 month, they do not have any sooner appointments.  If physician is unable to call me back, we will plan to send patient home on extended steroid taper     HIV/AIDS  History of recurrent shingles:  -Continue home regimen  -No current shingle outbreak appreciated; did receive shingles vaccination 1 week ago     DM 2:  -SSI ordered  -A1c 4.5     Chronic hypertension  Chronic hyperlipidemia:  -Continue home regimen     KARIN:  -Wears 3 L nasal cannula nightly, okay to continue     Chronic hypothyroidism:  -Continue home regimen     Obesity:  -BMI 39.33, diet and exercise encouraged     VTE Prophylaxis:  Mechanical VTE prophylaxis orders are present.    Disposition: Discharge to home    Follow-up after Discharge: PCP in 1 to 2 weeks, cardiology in 2 to 3 weeks, and rheumatology in 1 month    This note will serve as a discharge summary    Taylor Liang PA-C 07/09/24 03:39 EDT    I have worn appropriate PPE during this patient encounter, sanitized my hands both with entering and exiting patient's room.      35 minutes has been spent by [facility] providers in the care of this patient while under observation status

## 2024-07-09 NOTE — CASE MANAGEMENT/SOCIAL WORK
Discharge Planning Assessment  [facility]     Patient Name: Al Peña  MRN: [id number]  Today's Date: 7/9/2024    Admit Date: 7/8/2024    Plan: plans home- no needs   Discharge Needs Assessment       Row Name 07/09/24 1036       Living Environment    People in Home spouse    Name(s) of People in Home Tunde Otoole [phone]    Current Living Arrangements home    Potentially Unsafe Housing Conditions none    Primary Care Provided by self    Provides Primary Care For no one    Family Caregiver if Needed spouse    Family Caregiver Names Tunde Otoole [phone]    Quality of Family Relationships helpful;involved;supportive    Able to Return to Prior Arrangements yes       Resource/Environmental Concerns    Resource/Environmental Concerns none       Transition Planning    Patient/Family Anticipates Transition to home with family    Patient/Family Anticipated Services at Transition none       Discharge Needs Assessment    Readmission Within the Last 30 Days no previous admission in last 30 days    Equipment Currently Used at Home none    Concerns to be Addressed denies needs/concerns at this time                   Discharge Plan       Row Name 07/09/24 1037       Plan    Plan plans home- no needs    Patient/Family in Agreement with Plan yes    Plan Comments Spoke with patient at bedside. Introduced self and explained role. Facesheet verified. PCP is Dr Rene Davis. Patient will be using [facility] for medications. Patient live wih his spouse, Tunde Otoole [phone], and is IADLS.  He does not use any DME and does not have a HH or SNF history. At [place], he plans to return home and does not anticipate any needs. CCP will follow.                  Continued Care and Services - Admitted Since 7/8/2024    No active coordination exists for this encounter.          Demographic Summary       Row Name 07/09/24 1035       General Information    Admission Type observation    Arrived From emergency department     Referral Source admission list    Reason for Consult discharge planning    Preferred Language English                   Functional Status       Row Name 07/09/24 1035       Functional Status    Usual Activity Tolerance good    Current Activity Tolerance good       Functional Status, IADL    Medications independent    Meal Preparation independent    Housekeeping independent    Laundry independent    Shopping independent       Mental Status    General Appearance WDL WDL                   Psychosocial    No documentation.                  Abuse/Neglect    No documentation.                  Legal    No documentation.                  Substance Abuse    No documentation.                  Patient Forms    No documentation.                     Emily Godwin RN

## 2024-07-09 NOTE — PROGRESS NOTES
LOS: 0 days   Patient Care Team:  Rene Davis MD as PCP - General (Internal Medicine)    Chief Complaint: Follow-up chest pain.    Interval History: He feels much better in general after the steroids.  His inflammation is better.  His chest discomfort is better.  It is reproducible and consistent with costochondritis.    Vital Signs:  Temp:  [97.5 °F (36.4 °C)-98.2 °F (36.8 °C)] 97.5 °F (36.4 °C)  Heart Rate:  [66-82] 67  Resp:  [16-18] 18  BP: ()/(58-76) 112/70  No intake or output data in the 24 hours ending 07/09/24 1035    Physical Exam:   General Appearance:    No acute distress, alert and oriented x4.  Malar butterfly rash noted.   Lungs:     Clear to auscultation bilaterally     Heart:    Regular rhythm and normal rate.  No murmurs, gallops, or    rubs.   Abdomen:     Soft, nontender, nondistended.    Extremities:   Rash on upper extremities noted.     Results Review:    Results from last 7 days   Lab Units 07/09/24  0423   SODIUM mmol/L 138   POTASSIUM mmol/L 4.6   CHLORIDE mmol/L 106   CO2 mmol/L 21.3*   BUN mg/dL 15   CREATININE mg/dL 1.00   GLUCOSE mg/dL 143*   CALCIUM mg/dL 9.3     Results from last 7 days   Lab Units 07/09/24  0423 07/08/24  0947 07/08/24  0739   HSTROP T ng/L 7 6 8     Results from last 7 days   Lab Units 07/09/24  0423   WBC 10*3/mm3 7.95   HEMOGLOBIN g/dL 16.0   HEMATOCRIT % 46.5   PLATELETS 10*3/mm3 155     Results from last 7 days   Lab Units 07/08/24  0801   INR  1.10     Results from last 7 days   Lab Units 07/08/24  0739   CHOLESTEROL mg/dL 167     Results from last 7 days   Lab Units 07/09/24  0423   MAGNESIUM mg/dL 2.3     Results from last 7 days   Lab Units 07/08/24  0739   CHOLESTEROL mg/dL 167   TRIGLYCERIDES mg/dL 134   HDL CHOL mg/dL 32*   LDL CHOL mg/dL 111*       I reviewed the patient's new clinical results.        Assessment:  1.  Diffuse joint pain with multiple other rheumatological symptoms.  Constellation is concerning for connective tissue disease  exacerbation, most likely systemic lupus.  2.  Chest pain consistent with costochondritis  3.  HIV, on suppression therapy  4.  Recent intentional weight loss  5.  History of diabetes, prior to #4   6.  History of rectal cancer, status postchemotherapy and radiation  7.  History of recurrent shingles  8.  Obstructive sleep apnea    Plan:  -Again, he is much better after IV steroids.  He is having difficulty getting in with a rheumatologist.  I encouraged him to reach out to a tertiary center such as [facility] or [facility].  He is going to be sent home with steroids for now.    -His chest pain is reproducible and consistent with costochondritis, also likely related to inflammatory issues from his connective tissue disease.  No indication for stress testing.    -No evidence of pericardial effusion on echocardiogram.  EF is normal.    -Okay to discharge from a cardiac standpoint.  He did ask for follow-up in the office, which I will arrange.    Jason Jimenez MD  07/09/24  10:35 EDT

## 2024-07-09 NOTE — PLAN OF CARE
Problem: Adult Inpatient Plan of Care  Goal: Plan of Care Review  Outcome: Ongoing, Progressing  Flowsheets (Taken 7/9/2024 0334)  Plan of Care Reviewed With: patient  Outcome Evaluation: Patient admitted for chest pain. Patient had negative troponins, chest x-ray and EKG. Patient had Echo completed yesterday. Patient c/o joint pain with relief from pain medication. Patient to have cardiology consult. Patient A&Ox4, able to make needs known. Pts spouse is bedside.  Goal: Patient-Specific Goal (Individualized)  Outcome: Ongoing, Progressing  Goal: Absence of Hospital-Acquired Illness or Injury  Outcome: Ongoing, Progressing  Intervention: Identify and Manage Fall Risk  Recent Flowsheet Documentation  Taken 7/9/2024 0210 by Deana Ortiz RN  Safety Promotion/Fall Prevention:   safety round/check completed   room organization consistent   nonskid shoes/slippers when out of bed   clutter free environment maintained   lighting adjusted   activity supervised  Taken 7/9/2024 0007 by Deana Ortiz, RN  Safety Promotion/Fall Prevention:   safety round/check completed   room organization consistent   nonskid shoes/slippers when out of bed   clutter free environment maintained   lighting adjusted   activity supervised  Taken 7/8/2024 2230 by Deana Ortiz, RN  Safety Promotion/Fall Prevention:   safety round/check completed   room organization consistent   nonskid shoes/slippers when out of bed   clutter free environment maintained   lighting adjusted   activity supervised  Taken 7/8/2024 2010 by Deana Ortiz, RN  Safety Promotion/Fall Prevention:   safety round/check completed   room organization consistent   nonskid shoes/slippers when out of bed   clutter free environment maintained   lighting adjusted   activity supervised  Intervention: Prevent Skin Injury  Recent Flowsheet Documentation  Taken 7/9/2024 0210 by Deana Ortiz, RN  Body Position: position changed independently  Taken 7/9/2024 0007 by Deana Ortiz, STACY  Body  Position: position changed independently  Taken 7/8/2024 2230 by Deana Ortiz RN  Body Position: position changed independently  Taken 7/8/2024 2010 by Deana Ortiz RN  Body Position: position changed independently  Intervention: Prevent and Manage VTE (Venous Thromboembolism) Risk  Recent Flowsheet Documentation  Taken 7/9/2024 0210 by Deana Ortiz RN  Activity Management:   up ad bharati   activity encouraged  Taken 7/9/2024 0007 by Deana Ortiz RN  Activity Management: activity encouraged  Taken 7/8/2024 2230 by Deana Ortiz RN  Activity Management: activity encouraged  Taken 7/8/2024 2010 by Deana Ortiz RN  Activity Management:   activity encouraged   ambulated to bathroom  Intervention: Prevent Infection  Recent Flowsheet Documentation  Taken 7/9/2024 0210 by Deana Ortiz RN  Infection Prevention:   single patient room provided   hand hygiene promoted  Taken 7/9/2024 0007 by Deana Ortiz RN  Infection Prevention:   single patient room provided   hand hygiene promoted  Taken 7/8/2024 2230 by Deana Ortiz RN  Infection Prevention:   single patient room provided   hand hygiene promoted  Taken 7/8/2024 2010 by Deana Ortiz RN  Infection Prevention:   single patient room provided   hand hygiene promoted  Goal: Optimal Comfort and Wellbeing  Outcome: Ongoing, Progressing  Intervention: Provide Person-Centered Care  Recent Flowsheet Documentation  Taken 7/8/2024 2010 by Deana Ortiz RN  Trust Relationship/Rapport:   care explained   emotional support provided  Goal: Readiness for Transition of Care  Outcome: Ongoing, Progressing   Goal Outcome Evaluation:  Plan of Care Reviewed With: patient           Outcome Evaluation: Patient admitted for chest pain. Patient had negative troponins, chest x-ray and EKG. Patient had Echo completed yesterday. Patient c/o joint pain with relief from pain medication. Patient to have cardiology consult. Patient A&Ox4, able to make needs known. Pts spouse is bedside.

## 2024-07-09 NOTE — PLAN OF CARE
Goal Outcome Evaluation:                        Pt. 54 yr old AOX4 and able to verbalized needs. IV removed. Discharged summary provided and education completed. Medication delivered per [facility]. Pt instructed to follow up with PCP and Cardiology. Pt gathered all his belongings and left observation unit via private vehicle.  Pt did not have any other questions at the time of discharged.

## 2024-07-10 ENCOUNTER — HOSPITAL ENCOUNTER (EMERGENCY)
Facility: HOSPITAL | Age: 54
Discharge: HOME | End: 2024-07-10
Attending: EMERGENCY MEDICINE
Payer: COMMERCIAL

## 2024-07-10 ENCOUNTER — NURSE TRIAGE (OUTPATIENT)
Dept: CALL CENTER | Facility: HOSPITAL | Age: 54
End: 2024-07-10
Payer: COMMERCIAL

## 2024-07-10 ENCOUNTER — READMISSION MANAGEMENT (OUTPATIENT)
Dept: CALL CENTER | Facility: HOSPITAL | Age: 54
End: 2024-07-10
Payer: COMMERCIAL

## 2024-07-10 VITALS
SYSTOLIC BLOOD PRESSURE: 136 MMHG | WEIGHT: 285 LBS | BODY MASS INDEX: 38.6 KG/M2 | RESPIRATION RATE: 20 BRPM | OXYGEN SATURATION: 98 % | HEIGHT: 72 IN | TEMPERATURE: 97.6 F | DIASTOLIC BLOOD PRESSURE: 89 MMHG | HEART RATE: 88 BPM

## 2024-07-10 DIAGNOSIS — T50.905A ADVERSE EFFECT OF DRUG, INITIAL ENCOUNTER: ICD-10-CM

## 2024-07-10 DIAGNOSIS — E11.65 TYPE 2 DIABETES MELLITUS WITH HYPERGLYCEMIA, WITHOUT LONG-TERM CURRENT USE OF INSULIN: Primary | ICD-10-CM

## 2024-07-10 LAB
ALBUMIN SERPL-MCNC: 4 G/DL (ref 3.5–5.2)
ALBUMIN/GLOB SERPL: 1.5 G/DL
ALP SERPL-CCNC: 61 U/L (ref 39–117)
ALT SERPL W P-5'-P-CCNC: 32 U/L (ref 1–41)
ANION GAP SERPL CALCULATED.3IONS-SCNC: 8.7 MMOL/L (ref 5–15)
AST SERPL-CCNC: 19 U/L (ref 1–40)
BILIRUB SERPL-MCNC: 0.5 MG/DL (ref 0–1.2)
BUN SERPL-MCNC: 20 MG/DL (ref 6–20)
BUN/CREAT SERPL: 20 (ref 7–25)
CALCIUM SPEC-SCNC: 9.3 MG/DL (ref 8.6–10.5)
CHLORIDE SERPL-SCNC: 110 MMOL/L (ref 98–107)
CO2 SERPL-SCNC: 20.3 MMOL/L (ref 22–29)
CREAT SERPL-MCNC: 1 MG/DL (ref 0.76–1.27)
EGFRCR SERPLBLD CKD-EPI 2021: 90 ML/MIN/1.73
GLOBULIN UR ELPH-MCNC: 2.6 GM/DL
GLUCOSE SERPL-MCNC: 180 MG/DL (ref 65–99)
POTASSIUM SERPL-SCNC: 4.1 MMOL/L (ref 3.5–5.2)
PROT SERPL-MCNC: 6.6 G/DL (ref 6–8.5)
SODIUM SERPL-SCNC: 139 MMOL/L (ref 136–145)

## 2024-07-10 PROCEDURE — 36415 COLL VENOUS BLD VENIPUNCTURE: CPT

## 2024-07-10 PROCEDURE — 99283 EMERGENCY DEPT VISIT LOW MDM: CPT

## 2024-07-10 PROCEDURE — 80053 COMPREHEN METABOLIC PANEL: CPT | Performed by: EMERGENCY MEDICINE

## 2024-07-10 PROCEDURE — 99284 EMERGENCY DEPT VISIT MOD MDM: CPT | Performed by: EMERGENCY MEDICINE

## 2024-07-10 RX ORDER — DIPHENHYDRAMINE HCL 50 MG
50 CAPSULE ORAL EVERY 6 HOURS PRN
Qty: 20 CAPSULE | Refills: 0 | Status: SHIPPED | OUTPATIENT
Start: 2024-07-10

## 2024-07-10 NOTE — TELEPHONE ENCOUNTER
"Caller reported was just discharged from hospital, stated was getting steroids in hospital, just checked blood sugar and it is 185, having dizziness, blurry vision, just don't feel well. Supposed to start steroid pack in morning, concerned about blood sugar going higher  Reason for Disposition   Patient sounds very sick or weak to the triager    Additional Information   Negative: Unconscious or difficult to awaken   Negative: Acting confused (e.g., disoriented, slurred speech)   Negative: Very weak (e.g., can't stand)   Negative: Sounds like a life-threatening emergency to the triager   Negative: [1] Vomiting AND [2] signs of dehydration (e.g., very dry mouth, lightheaded, dark urine)   Negative: [1] Blood glucose > 240 mg/dL (13.3 mmol/L) AND [2] rapid breathing   Negative: Blood glucose > 500 mg/dL (27.8 mmol/L)   Negative: [1] Blood glucose > 240 mg/dL (13.3 mmol/L) AND [2] urine ketones moderate-large (or more than 1+)   Negative: [1] Blood glucose > 240 mg/dL (13.3 mmol/L) AND [2] blood ketones > 1.4 mmol/L   Negative: [1] Blood glucose > 240 mg/dL (13.3 mmol/L) AND [2] vomiting AND [3] unable to check for ketones (in blood or urine)   Negative: [1] New-onset diabetes suspected (e.g., frequent urination, weak, weight loss) AND [2] vomiting or rapid breathing   Negative: Vomiting lasts > 4 hours    Answer Assessment - Initial Assessment Questions  1. BLOOD GLUCOSE: \"What is your blood glucose level?\"       185  2. ONSET: \"When did you check the blood glucose?\"      Prior to calling  3. USUAL RANGE: \"What is your glucose level usually?\" (e.g., usual fasting morning value, usual evening value)        4. KETONES: \"Do you check for ketones (urine or blood test strips)?\" If Yes, ask: \"What does the test show now?\"       N/a  5. TYPE 1 or 2:  \"Do you know what type of diabetes you have?\"  (e.g., Type 1, Type 2, Gestational; doesn't know)       Type 2  6. INSULIN: \"Do you take insulin?\" \"What type of insulin(s) " "do you use? What is the mode of delivery? (syringe, pen; injection or pump)?\"       mounjaro  7. DIABETES PILLS: \"Do you take any pills for your diabetes?\" If Yes, ask: \"Have you missed taking any pills recently?\"      denied  8. OTHER SYMPTOMS: \"Do you have any symptoms?\" (e.g., fever, frequent urination, difficulty breathing, dizziness, weakness, vomiting)      Blurry vision, dizzy  9. PREGNANCY: \"Is there any chance you are pregnant?\" \"When was your last menstrual period?\"      N/a    Protocols used: Diabetes - High Blood Sugar-ADULT-AH    "

## 2024-07-10 NOTE — OUTREACH NOTE
Prep Survey      Flowsheet Row Responses   [facility] facility patient discharged from? [place]   Is LACE score < 7 ? No   Eligibility Readm Mgmt   Discharge diagnosis chest pain   Does the patient have one of the following disease processes/diagnoses(primary or secondary)? Other   Does the patient have Home health ordered? No   Is there a DME ordered? No   Prep survey completed? Yes            Danna ERNANDEZ - Registered Nurse

## 2024-07-10 NOTE — FSED PROVIDER NOTE
"Subjective   History of Present Illness  53yo male pmh significant htn/hyperlipidemia/dm2/jesika/hypothyroid/HIV/rectal ca, recently discharged [facility] 07.09.2024 secondary to chest pain evaluation prescribed oral steroids for probable SLE, presents ED c/o \"feeling jittery\" after taking prednisone.  Pt reports accucheck 189mg/dl.  ROS otherwise noncontributory.    History provided by:  Patient  Illness  Associated symptoms: rash        Review of Systems   Constitutional: Negative.    HENT: Negative.     Eyes: Negative.    Respiratory: Negative.     Cardiovascular: Negative.    Gastrointestinal: Negative.    Musculoskeletal:  Positive for arthralgias.   Skin:  Positive for rash.   Allergic/Immunologic: Positive for immunocompromised state.   All other systems reviewed and are negative.      Past Medical History:   Diagnosis Date    Anesthesia complication     ET TUBE CUFF WASN'T FULLY DEFLATED UPON REMOVAL AFTER GALLBLADDER SURGERY.  REQUIRED HOSPITALIZATION X1 WEEK AFTERWARDS    Anxiety     Diabetes mellitus     Disease of thyroid gland     Elevated triglycerides with high cholesterol     Fatty liver     Hard to intubate     WITH GALLBLADDER SURGERY    Heart palpitations     History of chemotherapy 2019    History of radiation therapy     HIV disease     On home oxygen therapy     3L NC AT NIGHT    Rectal cancer     Shingles     RECURRING, AT LEAST 8 TIMES    Slow gastric motility     Slow to wake up after anesthesia        Allergies   Allergen Reactions    Oxycodone-Acetaminophen Itching    Promethazine Itching     swelling    Sulfa Antibiotics Anaphylaxis     Throat closes    Neomycin Other (See Comments)     SKIN COMES OFF    Clonazepam Hives and Itching    Duloxetine Hcl Unknown - Low Severity, Other (See Comments) and Unknown (See Comments)     \"made me crazy\"    Niacin GI Intolerance    Pregabalin Other (See Comments), Hallucinations, Itching and Unknown (See Comments)     \"double vision\"       Past " Surgical History:   Procedure Laterality Date    AXILLARY LYMPH NODE BIOPSY/EXCISION Left     CHOLECYSTECTOMY  2002    COLONOSCOPY N/A 07/08/2016    Procedure: COLONOSCOPY TO CECUM;  Surgeon: Georges Gusman MD;  Location:  [facility];  Service:     COLONOSCOPY N/A 12/15/2021    Procedure: COLONOSCOPY INTO CECUM AND TI WITH BIOPSIES;  Surgeon: Celso Odonnell MD;  Location: [facility];  Service: Gastroenterology;  Laterality: N/A;  PRE: FECAL INCONTINENCE  POST: DECREASED VASCULARITY    ENDOSCOPY N/A 07/08/2016    Procedure: ESOPHAGOGASTRODUODENOSCOPY WITH COLD BIOPSIES;  Surgeon: Georges Gusman MD;  Location: [facility];  Service:     ENDOSCOPY N/A 12/15/2021    Procedure: ESOPHAGOGASTRODUODENOSCOPY WITH BIOPSIES, 54FR LEZAMA DILATATION;  Surgeon: Celso Odonnell MD;  Location: [facility] ENDOSCOPY;  Service: Gastroenterology;  Laterality: N/A;  PRE: DYSPHAGIA  POST: GASTRITIS, IRREGULAR Z LINE, ESOPHAGEAL RING    ENDOSCOPY N/A 5/11/2022    Procedure: ESOPHAGOGASTRODUODENOSCOPY WITH LEZAMA DILATATION 54&56;  Surgeon: Celso Odonnell MD;  Location: [facility] ENDOSCOPY;  Service: Gastroenterology;  Laterality: N/A;  DYSPHAGIA  POST:ESOPHAGEAL RING; ESOPHAGITIS; GASTRITIS; GASTROPARESIS    RECTAL SURGERY      TONSILLECTOMY      TOOTH EXTRACTION  10/15/2018    VENOUS ACCESS DEVICE (PORT) INSERTION AND REMOVAL         Family History   Problem Relation Age of Onset    Colon cancer Maternal Grandfather     Malig Hyperthermia Neg Hx        Social History     Socioeconomic History    Marital status:    Tobacco Use    Smoking status: Never    Smokeless tobacco: Never   Vaping Use    Vaping status: Never Used   Substance and Sexual Activity    Alcohol use: No    Drug use: No    Sexual activity: Defer           Objective   Physical Exam  Vitals and nursing note reviewed.   Constitutional:       Appearance: Normal appearance.   HENT:      Head: Normocephalic and atraumatic.      Right Ear: External ear  normal.      Left Ear: External ear normal.      Nose: Nose normal. No rhinorrhea.      Mouth/Throat:      Mouth: Mucous membranes are moist.      Pharynx: Oropharynx is clear. No oropharyngeal exudate or posterior oropharyngeal erythema.   Eyes:      Pupils: Pupils are equal, round, and reactive to light.   Cardiovascular:      Rate and Rhythm: Normal rate and regular rhythm.      Pulses: Normal pulses.      Heart sounds: Normal heart sounds. No murmur heard.     No friction rub. No gallop.   Pulmonary:      Effort: Pulmonary effort is normal. No respiratory distress.      Breath sounds: Normal breath sounds. No wheezing, rhonchi or rales.   Abdominal:      General: Bowel sounds are normal. There is no distension.      Palpations: Abdomen is soft.   Musculoskeletal:         General: No swelling or deformity.      Cervical back: Normal range of motion and neck supple. No rigidity.   Lymphadenopathy:      Cervical: No cervical adenopathy.   Skin:     General: Skin is warm and dry.      Findings: Rash present.      Comments: Malar rash   Neurological:      General: No focal deficit present.      Mental Status: He is alert and oriented to person, place, and time.      GCS: GCS eye subscore is 4. GCS verbal subscore is 5. GCS motor subscore is 6.         Procedures           ED Course      Labs Reviewed   COMPREHENSIVE METABOLIC PANEL - Abnormal; Notable for the following components:       Result Value    Glucose 180 (*)     Chloride 110 (*)     CO2 20.3 (*)     All other components within normal limits    Narrative:     GFR Normal >60  Chronic Kidney Disease <60  Kidney Failure <15                                            Medical Decision Making  Problems Addressed:  Adverse effect of drug, initial encounter: acute illness or injury  Type 2 diabetes mellitus with hyperglycemia, without long-term current use of insulin: acute illness or injury        Final diagnoses:   Type 2 diabetes mellitus with hyperglycemia,  without long-term current use of insulin   Adverse effect of drug, initial encounter       ED Disposition  ED Disposition       ED Disposition   Discharge    Condition   Good    Comment   --               Rene Davis MD  [address]  [place] [id number] [phone]    In 1 day           Medication List        Changed      acyclovir 800 MG tablet  Commonly known as: ZOVIRAX  TAKE ONE TABLET BY MOUTH THREE TIMES A DAY  What changed:   how much to take  how to take this  when to take this  reasons to take this     * diphenhydrAMINE 25 mg capsule  Commonly known as: BENADRYL  Take 1 capsule by mouth Every 6 (Six) Hours As Needed for Itching or Allergies.  What changed: Another medication with the same name was added. Make sure you understand how and when to take each.     * diphenhydrAMINE 50 MG capsule  Commonly known as: BENADRYL  Take 1 capsule by mouth Every 6 (Six) Hours As Needed (anxiety).  What changed: You were already taking a medication with the same name, and this prescription was added. Make sure you understand how and when to take each.           * This list has 2 medication(s) that are the same as other medications prescribed for you. Read the directions carefully, and ask your doctor or other care provider to review them with you.                   Where to Get Your Medications        These medications were sent to [facility] [id number] - [facility] [address] AT [facility] & [facility] - [phone] [facility] [phone]   [address] [place] [address], [facility] [address]      Phone: [phone]   diphenhydrAMINE 50 MG capsule

## 2024-07-31 ENCOUNTER — READMISSION MANAGEMENT (OUTPATIENT)
Dept: CALL CENTER | Facility: HOSPITAL | Age: 54
End: 2024-07-31
Payer: COMMERCIAL

## 2024-07-31 NOTE — OUTREACH NOTE
Medical Week 3 Survey      Flowsheet Row Responses   [facility] patient discharged from? [facility]   Does the patient have one of the following disease processes/diagnoses(primary or secondary)? Other   Week 3 attempt successful? No   Unsuccessful attempts Attempt 1            Nereyda CALVERT - Registered Nurse

## 2024-12-22 ENCOUNTER — APPOINTMENT (OUTPATIENT)
Dept: ULTRASOUND IMAGING | Facility: HOSPITAL | Age: 54
End: 2024-12-22
Payer: COMMERCIAL

## 2024-12-22 ENCOUNTER — HOSPITAL ENCOUNTER (EMERGENCY)
Facility: HOSPITAL | Age: 54
Discharge: HOME | End: 2024-12-22
Attending: EMERGENCY MEDICINE | Admitting: EMERGENCY MEDICINE
Payer: COMMERCIAL

## 2024-12-22 VITALS
TEMPERATURE: 97.8 F | OXYGEN SATURATION: 98 % | BODY MASS INDEX: 40.51 KG/M2 | HEART RATE: 65 BPM | DIASTOLIC BLOOD PRESSURE: 75 MMHG | SYSTOLIC BLOOD PRESSURE: 110 MMHG | RESPIRATION RATE: 15 BRPM | WEIGHT: 299.1 LBS | HEIGHT: 72 IN

## 2024-12-22 DIAGNOSIS — R45.89 ANXIETY ABOUT HEALTH: Primary | ICD-10-CM

## 2024-12-22 DIAGNOSIS — R11.2 NAUSEA AND VOMITING, UNSPECIFIED VOMITING TYPE: ICD-10-CM

## 2024-12-22 LAB
ALBUMIN SERPL-MCNC: 4.5 G/DL (ref 3.5–5.2)
ALBUMIN/GLOB SERPL: 1.7 G/DL
ALP SERPL-CCNC: 87 U/L (ref 39–117)
ALT SERPL W P-5'-P-CCNC: 37 U/L (ref 1–41)
ANION GAP SERPL CALCULATED.3IONS-SCNC: 9.9 MMOL/L (ref 5–15)
AST SERPL-CCNC: 27 U/L (ref 1–40)
BASOPHILS # BLD AUTO: 0.01 10*3/MM3 (ref 0–0.2)
BASOPHILS NFR BLD AUTO: 0.2 % (ref 0–1.5)
BILIRUB SERPL-MCNC: 0.7 MG/DL (ref 0–1.2)
BUN SERPL-MCNC: 12 MG/DL (ref 6–20)
BUN/CREAT SERPL: 9.9 (ref 7–25)
CALCIUM SPEC-SCNC: 9.6 MG/DL (ref 8.6–10.5)
CHLORIDE SERPL-SCNC: 103 MMOL/L (ref 98–107)
CO2 SERPL-SCNC: 25.1 MMOL/L (ref 22–29)
CREAT SERPL-MCNC: 1.21 MG/DL (ref 0.76–1.27)
D DIMER PPP FEU-MCNC: 0.4 MCGFEU/ML (ref 0–0.53)
DEPRECATED RDW RBC AUTO: 40.9 FL (ref 37–54)
EGFRCR SERPLBLD CKD-EPI 2021: 71.6 ML/MIN/1.73
EOSINOPHIL # BLD AUTO: 0.2 10*3/MM3 (ref 0–0.4)
EOSINOPHIL NFR BLD AUTO: 3.9 % (ref 0.3–6.2)
ERYTHROCYTE [DISTWIDTH] IN BLOOD BY AUTOMATED COUNT: 13 % (ref 12.3–15.4)
GLOBULIN UR ELPH-MCNC: 2.6 GM/DL
GLUCOSE SERPL-MCNC: 112 MG/DL (ref 65–99)
HCT VFR BLD AUTO: 50.9 % (ref 37.5–51)
HGB BLD-MCNC: 17.4 G/DL (ref 13–17.7)
IMM GRANULOCYTES # BLD AUTO: 0.01 10*3/MM3 (ref 0–0.05)
IMM GRANULOCYTES NFR BLD AUTO: 0.2 % (ref 0–0.5)
LYMPHOCYTES # BLD AUTO: 1.32 10*3/MM3 (ref 0.7–3.1)
LYMPHOCYTES NFR BLD AUTO: 25.8 % (ref 19.6–45.3)
MCH RBC QN AUTO: 29 PG (ref 26.6–33)
MCHC RBC AUTO-ENTMCNC: 34.2 G/DL (ref 31.5–35.7)
MCV RBC AUTO: 85 FL (ref 79–97)
MONOCYTES # BLD AUTO: 0.23 10*3/MM3 (ref 0.1–0.9)
MONOCYTES NFR BLD AUTO: 4.5 % (ref 5–12)
NEUTROPHILS NFR BLD AUTO: 3.35 10*3/MM3 (ref 1.7–7)
NEUTROPHILS NFR BLD AUTO: 65.4 % (ref 42.7–76)
PLATELET # BLD AUTO: 154 10*3/MM3 (ref 140–450)
PMV BLD AUTO: 9.4 FL (ref 6–12)
POTASSIUM SERPL-SCNC: 4.2 MMOL/L (ref 3.5–5.2)
PROT SERPL-MCNC: 7.1 G/DL (ref 6–8.5)
RBC # BLD AUTO: 5.99 10*6/MM3 (ref 4.14–5.8)
SODIUM SERPL-SCNC: 138 MMOL/L (ref 136–145)
WBC NRBC COR # BLD AUTO: 5.12 10*3/MM3 (ref 3.4–10.8)

## 2024-12-22 PROCEDURE — 36415 COLL VENOUS BLD VENIPUNCTURE: CPT

## 2024-12-22 PROCEDURE — 80053 COMPREHEN METABOLIC PANEL: CPT | Performed by: EMERGENCY MEDICINE

## 2024-12-22 PROCEDURE — 85025 COMPLETE CBC W/AUTO DIFF WBC: CPT | Performed by: EMERGENCY MEDICINE

## 2024-12-22 PROCEDURE — 85379 FIBRIN DEGRADATION QUANT: CPT | Performed by: EMERGENCY MEDICINE

## 2024-12-22 PROCEDURE — 99284 EMERGENCY DEPT VISIT MOD MDM: CPT

## 2024-12-22 PROCEDURE — 63710000001 ONDANSETRON ODT 4 MG TABLET DISPERSIBLE: Performed by: EMERGENCY MEDICINE

## 2024-12-22 PROCEDURE — 93971 EXTREMITY STUDY: CPT

## 2024-12-22 RX ORDER — ONDANSETRON 2 MG/ML
4 INJECTION INTRAMUSCULAR; INTRAVENOUS ONCE
Status: DISCONTINUED | OUTPATIENT
Start: 2024-12-22 | End: 2024-12-22

## 2024-12-22 RX ORDER — ONDANSETRON 4 MG/1
4 TABLET, ORALLY DISINTEGRATING ORAL EVERY 8 HOURS PRN
Qty: 6 TABLET | Refills: 0 | Status: SHIPPED | OUTPATIENT
Start: 2024-12-22

## 2024-12-22 RX ORDER — ONDANSETRON 4 MG/1
4 TABLET, ORALLY DISINTEGRATING ORAL ONCE
Status: COMPLETED | OUTPATIENT
Start: 2024-12-22 | End: 2024-12-22

## 2024-12-22 RX ADMIN — ONDANSETRON 4 MG: 4 TABLET, ORALLY DISINTEGRATING ORAL at 01:33

## 2024-12-22 NOTE — FSED PROVIDER NOTE
"Subjective   History of Present Illness  55yo male pmh significant HIV/rectal CA/hyperlipidemia/dm2/anxiety presents ED c/o multiple physical complaints/concerns.  Pt states he hasn't felt well for several days.  Pt states awoke feeling poorly et had episode of nausea/vomiting with blood pressure 119/97 \"which is elevated\" for him.  Pt additionally reports lower extremity edema and left calf discomfort which patient is concerned about 'blood clots.'    History provided by:  Patient  Illness  Associated symptoms: nausea    Associated symptoms: no abdominal pain and no vomiting        Review of Systems   Constitutional: Negative.    HENT: Negative.     Eyes: Negative.    Respiratory: Negative.     Cardiovascular: Negative.    Gastrointestinal:  Positive for nausea. Negative for abdominal pain and vomiting.   Musculoskeletal:  Positive for arthralgias.   Allergic/Immunologic: Positive for immunocompromised state.   All other systems reviewed and are negative.      Past Medical History:   Diagnosis Date    Anesthesia complication     ET TUBE CUFF WASN'T FULLY DEFLATED UPON REMOVAL AFTER GALLBLADDER SURGERY.  REQUIRED HOSPITALIZATION X1 WEEK AFTERWARDS    Anxiety     Diabetes mellitus     Disease of thyroid gland     Elevated triglycerides with high cholesterol     Fatty liver     Hard to intubate     WITH GALLBLADDER SURGERY    Heart palpitations     History of chemotherapy 2019    History of radiation therapy     HIV disease     On home oxygen therapy     3L NC AT NIGHT    Rectal cancer     Shingles     RECURRING, AT LEAST 8 TIMES    Slow gastric motility     Slow to wake up after anesthesia        Allergies   Allergen Reactions    Oxycodone-Acetaminophen Itching    Promethazine Itching     swelling    Sulfa Antibiotics Anaphylaxis     Throat closes    Neomycin Other (See Comments)     SKIN COMES OFF    Clonazepam Hives and Itching    Duloxetine Hcl Unknown - Low Severity, Other (See Comments) and Unknown (See " "Comments)     \"made me crazy\"    Niacin GI Intolerance    Pregabalin Other (See Comments), Hallucinations, Itching and Unknown (See Comments)     \"double vision\"       Past Surgical History:   Procedure Laterality Date    AXILLARY LYMPH NODE BIOPSY/EXCISION Left     CHOLECYSTECTOMY  2002    COLONOSCOPY N/A 07/08/2016    Procedure: COLONOSCOPY TO CECUM;  Surgeon: Georges Gusman MD;  Location: [facility] ENDOSCOPY;  Service:     COLONOSCOPY N/A 12/15/2021    Procedure: COLONOSCOPY INTO CECUM AND TI WITH BIOPSIES;  Surgeon: Celso Odonnell MD;  Location: [facility] ENDOSCOPY;  Service: Gastroenterology;  Laterality: N/A;  PRE: FECAL INCONTINENCE  POST: DECREASED VASCULARITY    ENDOSCOPY N/A 07/08/2016    Procedure: ESOPHAGOGASTRODUODENOSCOPY WITH COLD BIOPSIES;  Surgeon: Georges Gusman MD;  Location: [facility] ENDOSCOPY;  Service:     ENDOSCOPY N/A 12/15/2021    Procedure: ESOPHAGOGASTRODUODENOSCOPY WITH BIOPSIES, 54FR LEZAMA DILATATION;  Surgeon: Ceslo Odonnell MD;  Location: [facility] ENDOSCOPY;  Service: Gastroenterology;  Laterality: N/A;  PRE: DYSPHAGIA  POST: GASTRITIS, IRREGULAR Z LINE, ESOPHAGEAL RING    ENDOSCOPY N/A 5/11/2022    Procedure: ESOPHAGOGASTRODUODENOSCOPY WITH LEZAMA DILATATION 54&56;  Surgeon: Celso Odonnell MD;  Location: [facility] ENDOSCOPY;  Service: Gastroenterology;  Laterality: N/A;  DYSPHAGIA  POST:ESOPHAGEAL RING; ESOPHAGITIS; GASTRITIS; GASTROPARESIS    RECTAL SURGERY      TONSILLECTOMY      TOOTH EXTRACTION  10/15/2018    VENOUS ACCESS DEVICE (PORT) INSERTION AND REMOVAL         Family History   Problem Relation Age of Onset    Colon cancer Maternal Grandfather     Malig Hyperthermia Neg Hx        Social History     Socioeconomic History    Marital status:    Tobacco Use    Smoking status: Never    Smokeless tobacco: Never   Vaping Use    Vaping status: Never Used   Substance and Sexual Activity    Alcohol use: No    Drug use: No    Sexual activity: Defer           Objective   Physical " Exam  Vitals and nursing note reviewed.   Constitutional:       Appearance: Normal appearance. He is obese.   HENT:      Head: Normocephalic and atraumatic.      Right Ear: External ear normal.      Left Ear: External ear normal.      Nose: Nose normal.      Mouth/Throat:      Mouth: Mucous membranes are moist.      Pharynx: Oropharynx is clear.   Eyes:      Pupils: Pupils are equal, round, and reactive to light.   Cardiovascular:      Rate and Rhythm: Normal rate and regular rhythm.      Pulses: Normal pulses.      Heart sounds: Normal heart sounds. No murmur heard.     No friction rub. No gallop.   Pulmonary:      Effort: Pulmonary effort is normal.      Breath sounds: Normal breath sounds. No wheezing, rhonchi or rales.   Abdominal:      General: Abdomen is protuberant. Bowel sounds are normal.      Palpations: Abdomen is soft.      Tenderness: There is no abdominal tenderness. There is no guarding or rebound.   Musculoskeletal:         General: No swelling or deformity.      Cervical back: Normal range of motion and neck supple. No rigidity.      Right lower leg: No edema.      Left lower leg: No edema.   Lymphadenopathy:      Cervical: No cervical adenopathy.   Skin:     General: Skin is warm and dry.   Neurological:      Mental Status: He is alert.      GCS: GCS eye subscore is 4. GCS verbal subscore is 5. GCS motor subscore is 6.         Procedures           ED Course      Labs Reviewed   COMPREHENSIVE METABOLIC PANEL - Abnormal; Notable for the following components:       Result Value    Glucose 112 (*)     All other components within normal limits    Narrative:     GFR Categories in Chronic Kidney Disease (CKD)      GFR Category          GFR (mL/min/1.73)    Interpretation  G1                     90 or greater         Normal or high (1)  G2                      60-89                Mild decrease (1)  G3a                   45-59                Mild to moderate decrease  G3b                   30-44             "    Moderate to severe decrease  G4                    15-29                Severe decrease  G5                    14 or less           Kidney failure          (1)In the absence of evidence of kidney disease, neither GFR category G1 or G2 fulfill the criteria for CKD.    eGFR calculation 2021 CKD-EPI creatinine equation, which does not include race as a factor   CBC WITH AUTO DIFFERENTIAL - Abnormal; Notable for the following components:    RBC 5.99 (*)     Monocyte % 4.5 (*)     All other components within normal limits   D-DIMER, QUANTITATIVE - Normal    Narrative:     According to the assay 's published package insert, a normal (<0.50 MCGFEU/mL) D-dimer result in conjunction with a non-high clinical probability assessment, excludes deep vein thrombosis (DVT) and pulmonary embolism (PE) with high sensitivity.    D-dimer values increase with age and this can make VTE exclusion of an older population difficult. To address this, the American College of [employer], based on best available evidence and recent guidelines, recommends that clinicians use age-adjusted D-dimer thresholds in patients greater than 50 years of age with: a) a low probability of PE who do not meet all Pulmonary Embolism Rule Out Criteria, or b) in those with intermediate probability of PE.   The formula for an age-adjusted D-dimer cut-off is \"age/100\".  For example, a 60 year old patient would have an age-adjusted cut-off of 0.60 MCGFEU/mL and an 80 year old 0.80 MCGFEU/mL.   CBC AND DIFFERENTIAL    Narrative:     The following orders were created for panel order CBC & Differential.  Procedure                               Abnormality         Status                     ---------                               -----------         ------                     CBC Auto Differential[[id number]]        Abnormal            Final result                 Please view results for these tests on the individual orders.     US Venous Doppler Lower " Extremity Left (duplex)    Result Date: 12/22/2024  Narrative: Patient: STEPHON QUISPE  Time Out: 06:30 Exam(s): US VENOUS LEFT LOWER EXTREMITY EXAM:   US Duplex Left Lower Extremity Veins CLINICAL HISTORY:   Pain. TECHNIQUE:   Real-time duplex ultrasound scan of the left lower extremity veins integrating B-mode two-dimensional vascular structure, Doppler spectral analysis, color flow Doppler imaging and compression. COMPARISON:   No relevant prior studies available. FINDINGS:   Deep veins:  Unremarkable.  No Deep vein thrombosis in the visualized common femoral, femoral, proximal deep femoral or popliteal veins.  The veins demonstrate normal color flow, are normally compressible, with normal phasic flow and or augmentation response.   Superficial veins:  Unremarkable.  No thrombus in the visualized great saphenous vein.   Soft tissues:  No acute findings.  No popliteal cyst. IMPRESSION:       No deep vein thrombosis of the left lower extremity.     Impression: Electronically signed by Gabriella Veras MD on 12-22-24 at 0630                                        Medical Decision Making  Labs/sonographic findings reviewed.  CBC/CMP: unremarkable.  D dimer: neg.  LLE duplex US: negative DVT (prelim).  /77.  Normal physical examination. Stable discharge w/reassurance. Pmd followup. Rx zofran 4mg odt tid prn. Return precautions provided.    Problems Addressed:  Anxiety about health: complicated acute illness or injury  Nausea and vomiting, unspecified vomiting type: complicated acute illness or injury    Amount and/or Complexity of Data Reviewed  Labs: ordered.    Risk  Prescription drug management.        Final diagnoses:   Anxiety about health   Nausea and vomiting, unspecified vomiting type       ED Disposition  ED Disposition       ED Disposition   Discharge    Condition   Good    Comment   --               Rene Davis MD  [address]  [place] [id number] [phone]    In 1 day            Medication List        Changed      acyclovir 800 MG tablet  Commonly known as: ZOVIRAX  TAKE ONE TABLET BY MOUTH THREE TIMES A DAY  What changed:   how much to take  how to take this  when to take this  reasons to take this     * ondansetron ODT 8 MG disintegrating tablet  Commonly known as: ZOFRAN-ODT  Take 1 tablet by mouth Every 6 (Six) Hours As Needed for Nausea or Vomiting.  What changed: Another medication with the same name was added. Make sure you understand how and when to take each.     * ondansetron ODT 4 MG disintegrating tablet  Commonly known as: ZOFRAN-ODT  Place 1 tablet on the tongue Every 6 (Six) Hours As Needed for Nausea.  What changed: Another medication with the same name was added. Make sure you understand how and when to take each.     * ondansetron ODT 4 MG disintegrating tablet  Commonly known as: ZOFRAN-ODT  Place 1 tablet on the tongue Every 8 (Eight) Hours As Needed for Vomiting or Nausea.  What changed: You were already taking a medication with the same name, and this prescription was added. Make sure you understand how and when to take each.           * This list has 3 medication(s) that are the same as other medications prescribed for you. Read the directions carefully, and ask your doctor or other care provider to review them with you.                   Where to Get Your Medications        These medications were sent to [facility] [id number] - [facility] [address] AT [facility] [phone] [facility] [phone]   [address] [place] [address], [facility] [address]      Phone: [phone]   ondansetron ODT 4 MG disintegrating tablet          Patient admitted from home due to worsening weakness, swelling of legs.

## (undated) DEVICE — LN SMPL CO2 SHTRM SD STREAM W/M LUER

## (undated) DEVICE — MSK PROC CURAPLEX O2 2/ADAPT 7FT

## (undated) DEVICE — SENSR O2 OXIMAX FNGR A/ 18IN NONSTR

## (undated) DEVICE — ADAPT CLN BIOGUARD AIR/H2O DISP

## (undated) DEVICE — KT ORCA ORCAPOD DISP STRL

## (undated) DEVICE — BITEBLOCK OMNI BLOC

## (undated) DEVICE — TUBING, SUCTION, 1/4" X 10', STRAIGHT: Brand: MEDLINE

## (undated) DEVICE — FRCP BX RADJAW4 NDL 2.8 240CM LG OG BX40